# Patient Record
Sex: FEMALE | Race: WHITE | NOT HISPANIC OR LATINO | Employment: FULL TIME | URBAN - METROPOLITAN AREA
[De-identification: names, ages, dates, MRNs, and addresses within clinical notes are randomized per-mention and may not be internally consistent; named-entity substitution may affect disease eponyms.]

---

## 2023-03-13 PROBLEM — L30.1 DYSHIDROTIC ECZEMA: Status: ACTIVE | Noted: 2023-03-13

## 2023-03-13 PROBLEM — L65.9 HAIR LOSS: Status: ACTIVE | Noted: 2023-03-13

## 2023-03-13 PROBLEM — E55.9 HYPOVITAMINOSIS D: Status: ACTIVE | Noted: 2023-03-13

## 2023-03-13 PROBLEM — M67.951 TENDINOPATHY OF RIGHT GLUTEUS MEDIUS: Status: ACTIVE | Noted: 2023-03-13

## 2023-03-13 PROBLEM — M76.899 HAMSTRING TENDINITIS AT ORIGIN: Status: ACTIVE | Noted: 2023-03-13

## 2023-03-13 PROBLEM — L71.9 ROSACEA: Status: ACTIVE | Noted: 2023-03-13

## 2023-03-13 PROBLEM — K62.5 BRBPR (BRIGHT RED BLOOD PER RECTUM): Status: ACTIVE | Noted: 2023-03-13

## 2023-03-13 PROBLEM — M62.838 MUSCLE SPASM: Status: ACTIVE | Noted: 2023-03-13

## 2023-03-13 PROBLEM — F41.1 GENERALIZED ANXIETY DISORDER: Status: ACTIVE | Noted: 2023-03-13

## 2023-03-13 PROBLEM — M79.604 PAIN IN POSTERIOR RIGHT LOWER EXTREMITY: Status: ACTIVE | Noted: 2023-03-13

## 2023-03-13 PROBLEM — R79.89 ABNORMAL CBC: Status: ACTIVE | Noted: 2023-03-13

## 2023-03-13 PROBLEM — R07.89 CHEST TIGHTNESS: Status: ACTIVE | Noted: 2023-03-13

## 2023-03-13 PROBLEM — R09.89 LYMPH NODE SYMPTOM: Status: ACTIVE | Noted: 2023-03-13

## 2023-03-13 PROBLEM — E78.5 HYPERLIPIDEMIA: Status: ACTIVE | Noted: 2023-03-13

## 2023-03-13 PROBLEM — M25.551 RIGHT HIP PAIN: Status: ACTIVE | Noted: 2023-03-13

## 2023-03-13 PROBLEM — M67.952 TENDINOPATHY OF LEFT GLUTEUS MEDIUS: Status: ACTIVE | Noted: 2023-03-13

## 2023-03-13 PROBLEM — J30.9 ALLERGIC RHINITIS: Status: ACTIVE | Noted: 2023-03-13

## 2023-03-13 PROBLEM — H69.92 DYSFUNCTION OF LEFT EUSTACHIAN TUBE: Status: ACTIVE | Noted: 2023-03-13

## 2023-03-13 PROBLEM — R59.0 CERVICAL LYMPHADENOPATHY: Status: ACTIVE | Noted: 2023-03-13

## 2023-03-13 PROBLEM — R92.8 ABNORMAL MAMMOGRAM: Status: ACTIVE | Noted: 2023-03-13

## 2023-03-13 PROBLEM — E04.1 THYROID NODULE: Status: ACTIVE | Noted: 2023-03-13

## 2023-03-13 PROBLEM — A49.02 MRSA (METHICILLIN RESISTANT STAPHYLOCOCCUS AUREUS): Status: ACTIVE | Noted: 2023-03-13

## 2023-03-13 PROBLEM — H81.10 BPPV (BENIGN PAROXYSMAL POSITIONAL VERTIGO): Status: ACTIVE | Noted: 2023-03-13

## 2023-03-13 PROBLEM — R20.2 PARESTHESIAS: Status: ACTIVE | Noted: 2023-03-13

## 2023-03-13 PROBLEM — M62.81 MUSCLE WEAKNESS: Status: ACTIVE | Noted: 2023-03-13

## 2023-03-13 PROBLEM — E06.3 HASHIMOTO'S THYROIDITIS: Status: ACTIVE | Noted: 2023-03-13

## 2023-03-13 PROBLEM — F51.04 PSYCHOPHYSIOLOGICAL INSOMNIA: Status: ACTIVE | Noted: 2023-03-13

## 2023-03-13 PROBLEM — K59.09 CHRONIC CONSTIPATION: Status: ACTIVE | Noted: 2023-03-13

## 2023-03-13 PROBLEM — N39.41 URGE INCONTINENCE OF URINE: Status: ACTIVE | Noted: 2023-03-13

## 2023-03-13 PROBLEM — E03.9 HYPOTHYROIDISM: Status: ACTIVE | Noted: 2023-03-13

## 2023-03-13 PROBLEM — F41.9 ANXIETY: Status: ACTIVE | Noted: 2023-03-13

## 2023-03-13 PROBLEM — N94.10 DYSPAREUNIA, FEMALE: Status: ACTIVE | Noted: 2023-03-13

## 2023-03-13 PROBLEM — N76.0 VAGINITIS: Status: ACTIVE | Noted: 2023-03-13

## 2023-03-13 PROBLEM — M25.552 LEFT HIP PAIN: Status: ACTIVE | Noted: 2023-03-13

## 2023-03-13 PROBLEM — G47.00 INSOMNIA: Status: ACTIVE | Noted: 2023-03-13

## 2023-03-13 PROBLEM — N92.6 IRREGULAR MENSTRUAL CYCLE: Status: ACTIVE | Noted: 2023-03-13

## 2023-03-13 PROBLEM — R79.89 HIGH THYROID STIMULATING HORMONE (TSH) LEVEL: Status: ACTIVE | Noted: 2023-03-13

## 2023-03-13 PROBLEM — J34.2 DEVIATED SEPTUM: Status: ACTIVE | Noted: 2023-03-13

## 2023-03-13 PROBLEM — N89.8 VAGINAL IRRITATION: Status: ACTIVE | Noted: 2023-03-13

## 2023-03-13 RX ORDER — LORATADINE 10 MG/1
1 TABLET ORAL DAILY
COMMUNITY
Start: 2021-07-13

## 2023-03-13 RX ORDER — BISMUTH SUBSALICYLATE 262 MG
1 TABLET,CHEWABLE ORAL DAILY
COMMUNITY

## 2023-03-13 RX ORDER — DROSPIRENONE AND ETHINYL ESTRADIOL 0.02-3(28)
1 KIT ORAL DAILY
COMMUNITY
Start: 2020-09-30 | End: 2024-01-17 | Stop reason: SDUPTHER

## 2023-03-13 RX ORDER — LEVOTHYROXINE SODIUM 75 UG/1
1 TABLET ORAL DAILY
COMMUNITY
Start: 2019-11-02 | End: 2023-05-30 | Stop reason: SDUPTHER

## 2023-03-13 RX ORDER — FLUTICASONE PROPIONATE 50 MCG
1-2 SPRAY, SUSPENSION (ML) NASAL DAILY
COMMUNITY
Start: 2021-07-13

## 2023-03-13 RX ORDER — BUSPIRONE HYDROCHLORIDE 7.5 MG/1
1 TABLET ORAL 2 TIMES DAILY
COMMUNITY
End: 2023-08-14

## 2023-03-13 RX ORDER — TRAZODONE HYDROCHLORIDE 50 MG/1
1 TABLET ORAL NIGHTLY PRN
COMMUNITY
Start: 2022-02-02 | End: 2023-04-14

## 2023-03-14 ENCOUNTER — TELEPHONE (OUTPATIENT)
Dept: PRIMARY CARE | Facility: CLINIC | Age: 43
End: 2023-03-14
Payer: COMMERCIAL

## 2023-03-24 ENCOUNTER — APPOINTMENT (OUTPATIENT)
Dept: PRIMARY CARE | Facility: CLINIC | Age: 43
End: 2023-03-24
Payer: COMMERCIAL

## 2023-03-24 ENCOUNTER — OFFICE VISIT (OUTPATIENT)
Dept: PRIMARY CARE | Facility: CLINIC | Age: 43
End: 2023-03-24
Payer: COMMERCIAL

## 2023-03-24 VITALS
DIASTOLIC BLOOD PRESSURE: 60 MMHG | HEART RATE: 67 BPM | TEMPERATURE: 97.9 F | WEIGHT: 149 LBS | HEIGHT: 69 IN | BODY MASS INDEX: 22.07 KG/M2 | SYSTOLIC BLOOD PRESSURE: 102 MMHG | RESPIRATION RATE: 16 BRPM | OXYGEN SATURATION: 100 %

## 2023-03-24 DIAGNOSIS — F41.1 GENERALIZED ANXIETY DISORDER: Primary | ICD-10-CM

## 2023-03-24 DIAGNOSIS — R07.89 ATYPICAL CHEST PAIN: ICD-10-CM

## 2023-03-24 DIAGNOSIS — G47.00 INSOMNIA, UNSPECIFIED TYPE: ICD-10-CM

## 2023-03-24 PROCEDURE — 99214 OFFICE O/P EST MOD 30 MIN: CPT | Performed by: FAMILY MEDICINE

## 2023-03-24 RX ORDER — VENLAFAXINE HYDROCHLORIDE 37.5 MG/1
37.5 CAPSULE, EXTENDED RELEASE ORAL DAILY
Qty: 14 CAPSULE | Refills: 0 | Status: SHIPPED | OUTPATIENT
Start: 2023-03-24 | End: 2023-08-14

## 2023-03-24 RX ORDER — VENLAFAXINE HYDROCHLORIDE 75 MG/1
75 CAPSULE, EXTENDED RELEASE ORAL DAILY
Qty: 30 CAPSULE | Refills: 1 | Status: SHIPPED | OUTPATIENT
Start: 2023-03-24 | End: 2023-08-14

## 2023-03-24 NOTE — PROGRESS NOTES
"Subjective   Patient ID: Kaylen Dalton is a 42 y.o. female who presents for Anxiety.    Anxiety  Patient is here for evaluation of anxiety.  She has the following anxiety symptoms: insomnia. Onset of symptoms was approximately 10 years ago.  Symptoms have been unchanged since that time. She denies current suicidal and homicidal ideation. Family history significant for no psychiatric illness.Possible organic causes contributing are: none. Risk factors: none Previous treatment includes medication BuSpar.   She complains of the following medication side effects: none.    She tried lexapro and had a side effect she had panic attack symptoms.  She was switched to trazadone and then for her physical in January we switched her to buspirone.  She also failed zoloft.  She hasn't been on an SNRI yet.      Something that bothers her is that she has chest pain when she has panic attacks and now she is having chest pain when she has anxiety attacks. When she has a panic attack it's a rapid beating of the heart and sometimes generalized chest pain.  It doesn't radiate into the jaw or arm.  She denies diaphoresis.  Her dad as high cholesterol but no CAD.  Grandma with CAD.      Anxiety             Review of Systems    Objective   /60   Pulse 67   Temp 36.6 °C (97.9 °F)   Resp 16   Ht 1.753 m (5' 9\")   Wt 67.6 kg (149 lb)   LMP 03/15/2023 (Approximate)   SpO2 100%   BMI 22.00 kg/m²     Physical Exam  Constitutional:       Appearance: Normal appearance.   HENT:      Head: Normocephalic and atraumatic.   Eyes:      Pupils: Pupils are equal, round, and reactive to light.   Cardiovascular:      Rate and Rhythm: Normal rate and regular rhythm.      Pulses: Normal pulses.      Heart sounds: No murmur heard.  Pulmonary:      Effort: Pulmonary effort is normal.      Breath sounds: Normal breath sounds.   Musculoskeletal:         General: No swelling.      Cervical back: Normal range of motion and neck supple. "   Neurological:      Mental Status: She is alert.         Assessment/Plan   Diagnoses and all orders for this visit:  Generalized anxiety disorder  -     venlafaxine XR (Effexor-XR) 37.5 mg 24 hr capsule; Take 1 capsule (37.5 mg) by mouth once daily for 14 days. Do not crush or chew.  -     venlafaxine XR (Effexor-XR) 75 mg 24 hr capsule; Take 1 capsule (75 mg) by mouth once daily. Take with food.  Insomnia, unspecified type  -     Referral to Adult Sleep Medicine; Future  Atypical chest pain  -     CT cardiac scoring wo IV contrast; Future

## 2023-03-24 NOTE — PATIENT INSTRUCTIONS
Today we followed up on anxiety and insomnia.   We will start effexor and have you use buspar prn  We will refer you to sleep specialist for insomnia  We will also have you get a calcium score test done for your cardiac evaluation.  I feel likely your pain is likely in conjunction with panic attacks but we will continue to do the workup to rule out any organic causes.

## 2023-04-13 DIAGNOSIS — G47.00 INSOMNIA, UNSPECIFIED TYPE: Primary | ICD-10-CM

## 2023-04-14 RX ORDER — TRAZODONE HYDROCHLORIDE 50 MG/1
TABLET ORAL
Qty: 90 TABLET | Refills: 1 | Status: SHIPPED | OUTPATIENT
Start: 2023-04-14 | End: 2023-08-14 | Stop reason: SDUPTHER

## 2023-05-17 NOTE — RESULT ENCOUNTER NOTE
Calcium CT Score is 0 which is best possible score - only 0.4% chance of having a cardiac event annually.

## 2023-05-30 ENCOUNTER — PATIENT MESSAGE (OUTPATIENT)
Dept: PRIMARY CARE | Facility: CLINIC | Age: 43
End: 2023-05-30
Payer: COMMERCIAL

## 2023-05-30 DIAGNOSIS — E03.9 HYPOTHYROIDISM, UNSPECIFIED TYPE: ICD-10-CM

## 2023-05-30 RX ORDER — LEVOTHYROXINE SODIUM 75 UG/1
75 TABLET ORAL DAILY
Qty: 90 TABLET | Refills: 0 | Status: SHIPPED | OUTPATIENT
Start: 2023-05-30 | End: 2023-06-02 | Stop reason: SDUPTHER

## 2023-06-02 ENCOUNTER — TELEPHONE (OUTPATIENT)
Dept: PRIMARY CARE | Facility: CLINIC | Age: 43
End: 2023-06-02
Payer: COMMERCIAL

## 2023-06-02 DIAGNOSIS — E03.9 HYPOTHYROIDISM, UNSPECIFIED TYPE: ICD-10-CM

## 2023-06-02 RX ORDER — LEVOTHYROXINE SODIUM 75 UG/1
75 TABLET ORAL DAILY
Qty: 90 TABLET | Refills: 0 | Status: SHIPPED | OUTPATIENT
Start: 2023-06-02 | End: 2023-11-27

## 2023-06-02 NOTE — TELEPHONE ENCOUNTER
Pt called and stated that she has been corresponding with Tess regarding a medication through my chart.  She stated that [facility] said they need the prescription for synthroid called in not sent e-script and that it needs to be marked as urgent Please call it to [phone].

## 2023-08-14 ENCOUNTER — OFFICE VISIT (OUTPATIENT)
Dept: PRIMARY CARE | Facility: CLINIC | Age: 43
End: 2023-08-14
Payer: COMMERCIAL

## 2023-08-14 VITALS
RESPIRATION RATE: 16 BRPM | SYSTOLIC BLOOD PRESSURE: 100 MMHG | TEMPERATURE: 98.5 F | HEART RATE: 61 BPM | WEIGHT: 150 LBS | OXYGEN SATURATION: 99 % | BODY MASS INDEX: 22.81 KG/M2 | DIASTOLIC BLOOD PRESSURE: 66 MMHG

## 2023-08-14 DIAGNOSIS — G47.00 INSOMNIA, UNSPECIFIED TYPE: ICD-10-CM

## 2023-08-14 DIAGNOSIS — E04.1 THYROID NODULE: ICD-10-CM

## 2023-08-14 DIAGNOSIS — F41.9 ANXIETY: Primary | ICD-10-CM

## 2023-08-14 DIAGNOSIS — F41.1 GENERALIZED ANXIETY DISORDER: ICD-10-CM

## 2023-08-14 PROCEDURE — 99214 OFFICE O/P EST MOD 30 MIN: CPT | Performed by: FAMILY MEDICINE

## 2023-08-14 RX ORDER — ESCITALOPRAM OXALATE 5 MG/1
5 TABLET ORAL DAILY
Qty: 30 TABLET | Refills: 1 | Status: SHIPPED | OUTPATIENT
Start: 2023-08-14 | End: 2023-09-06 | Stop reason: ALTCHOICE

## 2023-08-14 RX ORDER — TRAZODONE HYDROCHLORIDE 50 MG/1
50 TABLET ORAL NIGHTLY PRN
Qty: 90 TABLET | Refills: 1 | Status: SHIPPED | OUTPATIENT
Start: 2023-08-14 | End: 2024-01-31 | Stop reason: SDUPTHER

## 2023-08-14 NOTE — PROGRESS NOTES
Subjective   Patient ID: Kaylen Dalton is a 43 y.o. female who presents for Anxiety (Follow up ) and Hypothyroidism (Follow up ).    Hypothyroidism  Kaylen Dalton is a 43 y.o. female who presents for follow up of hypothyroidism. Current symptoms: change in energy level and anxiety. Patient denies diarrhea and heat / cold intolerance. Symptoms have gradually improved.    She has been using trazadone at night. She goes through waves of not feeling very good or feeling more anxious with. She was going to try effexor but she never did.  She thinks it's because she got a panic attack on lexapro.  She has been talking to some family members who are on lexapro though and she knows that her family does well with it.      She had seen a therapist in the past but would like another option.      Anxiety             Review of Systems    Objective   /66   Pulse 61   Temp 36.9 °C (98.5 °F)   Resp 16   Wt 68 kg (150 lb)   LMP 08/07/2023   SpO2 99%   BMI 22.81 kg/m²     Physical Exam  Constitutional:       Appearance: Normal appearance.   HENT:      Head: Normocephalic and atraumatic.      Mouth/Throat:      Mouth: Mucous membranes are moist.   Eyes:      Pupils: Pupils are equal, round, and reactive to light.   Cardiovascular:      Rate and Rhythm: Normal rate.      Pulses: Normal pulses.   Musculoskeletal:      Cervical back: Normal range of motion and neck supple.   Skin:     General: Skin is warm and dry.   Neurological:      General: No focal deficit present.      Mental Status: She is alert and oriented to person, place, and time. Mental status is at baseline.   Psychiatric:         Mood and Affect: Mood normal.         Behavior: Behavior normal.         Thought Content: Thought content normal.         Judgment: Judgment normal.         Assessment/Plan   Diagnoses and all orders for this visit:  Anxiety  -     escitalopram (Lexapro) 5 mg tablet; Take 1 tablet (5 mg) by mouth once daily.  Generalized  anxiety disorder  Thyroid nodule  -     Thyroxine, Free; Future  -     Thyroid Stimulating Hormone; Future  Insomnia, unspecified type  -     traZODone (Desyrel) 50 mg tablet; Take 1 tablet (50 mg) by mouth as needed at bedtime for sleep.

## 2023-08-14 NOTE — PATIENT INSTRUCTIONS
Today we followed up on your Thyroid medication - we will check your labs and adjust the medication accordingly.  Our goal will be to have a TSH between 2 and 3.  We will closely monitor your symptoms to determine the optimal dosing.     [facility] - [phone]  [facility]) -  [phone]  [facility] - [phone]   [facility]) - [phone]  [facility]) - [phone]  [facility] -  [phone]  ONLINE: [place], [phone]                 [email or web address] ([facility])    TEXT 980 Suicide and Crisis Lifeline  Hours: Available 24 hours. Languages: English, Uzbek     Today we have addressed your anxiety.  We have discussed starting an SSRI in addition to counseling/CBT.  I have ordered labs to rule out any organic causes.  I have advised that you RTC in 6 weeks for a recheck.

## 2023-08-18 ENCOUNTER — LAB (OUTPATIENT)
Dept: LAB | Facility: LAB | Age: 43
End: 2023-08-18
Payer: COMMERCIAL

## 2023-08-18 DIAGNOSIS — E04.1 THYROID NODULE: ICD-10-CM

## 2023-08-18 LAB
THYROTROPIN (MIU/L) IN SER/PLAS BY DETECTION LIMIT <= 0.05 MIU/L: 3.15 MIU/L (ref 0.44–3.98)
THYROXINE (T4) FREE (NG/DL) IN SER/PLAS: 0.73 NG/DL (ref 0.61–1.12)

## 2023-08-18 PROCEDURE — 84439 ASSAY OF FREE THYROXINE: CPT

## 2023-08-18 PROCEDURE — 84443 ASSAY THYROID STIM HORMONE: CPT

## 2023-08-18 PROCEDURE — 36415 COLL VENOUS BLD VENIPUNCTURE: CPT

## 2023-09-06 ENCOUNTER — TELEMEDICINE (OUTPATIENT)
Dept: PRIMARY CARE | Facility: CLINIC | Age: 43
End: 2023-09-06
Payer: COMMERCIAL

## 2023-09-06 DIAGNOSIS — L23.7 POISON IVY DERMATITIS: Primary | ICD-10-CM

## 2023-09-06 PROCEDURE — 99422 OL DIG E/M SVC 11-20 MIN: CPT | Performed by: FAMILY MEDICINE

## 2023-09-06 NOTE — PROGRESS NOTES
Rash--  Poison ivy--  got rash 5 days prior to her--her rash started 5 days ago  Sxs similar- his sxs were way worse  No pets  May have been in contact with plant  New random spots popping up  Some are seeping--  Pretty wide spread on underside of arms and lower legs and slight on abdomen  Waking up at night with itching--   Past 3 nights with intense itching  Has been trying hard not to touch it  Calamine and cortisone cream-- without much relief  Has sensitive skin  Has mupirocin cream --has not tried it for this--   Has h/o eczema and has steroid cream--fluocinonide cream 0.05%      ALL OTHER ROS (-)    General appearance:  Vitals available from patient?No  Alert, oriented, pleasant, in no apparent distress Yes  Answers questions appropriatelyYes  Eyes clear?Yes  Throat exam Not Available  Is patient in respiratory distressNo  Is patient coughing during consult:No  Audible wheezing noted? :No  Pt sounds congested?: No  Sniffing or rhinorrhea?: No  Frontal sinus TTP by palpation? N/A  Maxillary sinus TTP by palpation?N/A  Tender neck LAD by pt exam?:N/A  Preauricular LAD by pt exam?:N/A  Abdominal TTP by pt exam?:N/A  [facility] tenderness by pt exam?N/A  Psychiatric: Affect normalYes  Other relevant physical exam:  Vesicles on arms without red streaks or excoriations  Red  patches on legs --dry appearing and some seeping noted    Pt location in [place] and consent obtained. Telemedicine appropriate evaluation completed. Appropriate physical exam done.     Poison Ivy  Discussed oral steroids and side effects-- pt prefers to try topical first--has high potency steroid cream at home she will try--discussed AE of topical as well as oral steroids  Antihistamines  Mupirocin for lesions that may get infected from scratching

## 2023-09-07 NOTE — PATIENT INSTRUCTIONS
Please send me a [employer] message if you have any questions or concerns.  FOR NON URGENT questions only.  Allow up to 72 hours for response.    If you have prescription issues or other questions you can email   Nikia Blanca  Digital Health Coordinator, at   [email or web address]    Poison Ivy  Discussed oral steroids and side effects-- pt prefers to try topical first--has high potency steroid cream at home she will try--discussed AE of topical as well as oral steroids  Antihistamines  Mupirocin for lesions that may get infected from scratching    Rest and drink plenty of fluids    Tylenol and or motrin as needed for pain and fever (unless you have been told not to take these because of your personal medical history)    Discussed options and precautions:   Viral versus bacterial infection; use of medications; possible side effects; appropriate over-the-counter medications; possible complications and /or when to follow-up.    Follow-up in 1 to 2 days if not improving.  Follow-up immediately if symptoms worsen.    All red flags requiring in person care were discussed.  All patient's questions were answered.    Limitations to telemedicine include inability to do a complete and accurate physical exam.  Any concerns regarding this were conveyed with the patient and in person follow-up recommended if patient nature of illness does not progress as anticipated during this visit.     A few things about steroids:  they can increase your heart rate and blood pressure, they can cause insomnia, they can make you vance/grumpy, they can increase your appetite, they can cause your lower legs to swell, they can irritate the lining of your stomach so never take on an empty stomach, do not take any OTC anti-inflammatories like motrin, aleve, ibuprofen while taking prednisone, use tylenol if you need something for pain.  They can also cause adrenal suppression (most concerning in people who take steroids daily or  frequently) and they can cause immunosuppression so you are more likely to get sick while on them.  When we decide to use steroids we weight the risks and the benefits.  When breathing is affected, the benefits of steroids generally outweigh the risks.    TOPICAL STEROIDS:  Use sparingly ONLY on affected skin in a THIN LAYER for the shortest duration of time needed.  Can cause lightening of the skin, spider veins, thinning of the skin, and/or stretch marks.

## 2023-11-27 DIAGNOSIS — E03.9 HYPOTHYROIDISM, UNSPECIFIED TYPE: ICD-10-CM

## 2023-11-27 RX ORDER — LEVOTHYROXINE SODIUM 75 UG/1
75 TABLET ORAL DAILY
Qty: 90 TABLET | Refills: 0 | Status: SHIPPED | OUTPATIENT
Start: 2023-11-27 | End: 2024-01-17 | Stop reason: SDUPTHER

## 2023-11-27 RX ORDER — LEVOTHYROXINE SODIUM 75 UG/1
75 TABLET ORAL DAILY
Qty: 90 TABLET | Refills: 3 | OUTPATIENT
Start: 2023-11-27

## 2024-01-17 ENCOUNTER — TELEPHONE (OUTPATIENT)
Dept: PRIMARY CARE | Facility: CLINIC | Age: 44
End: 2024-01-17
Payer: COMMERCIAL

## 2024-01-17 DIAGNOSIS — Z00.00 HEALTHCARE MAINTENANCE: ICD-10-CM

## 2024-01-17 DIAGNOSIS — Z00.00 HEALTHCARE MAINTENANCE: Primary | ICD-10-CM

## 2024-01-17 DIAGNOSIS — E03.9 HYPOTHYROIDISM, UNSPECIFIED TYPE: ICD-10-CM

## 2024-01-17 RX ORDER — DROSPIRENONE AND ETHINYL ESTRADIOL 0.02-3(28)
1 KIT ORAL DAILY
Qty: 28 TABLET | Refills: 0 | Status: SHIPPED | OUTPATIENT
Start: 2024-01-17 | End: 2024-01-17 | Stop reason: SDUPTHER

## 2024-01-17 RX ORDER — DROSPIRENONE AND ETHINYL ESTRADIOL 0.02-3(28)
1 KIT ORAL DAILY
Qty: 84 TABLET | Refills: 3 | Status: SHIPPED | OUTPATIENT
Start: 2024-01-17 | End: 2024-01-17 | Stop reason: SDUPTHER

## 2024-01-17 RX ORDER — LEVOTHYROXINE SODIUM 75 UG/1
75 TABLET ORAL DAILY
Qty: 30 TABLET | Refills: 0 | Status: SHIPPED | OUTPATIENT
Start: 2024-01-17 | End: 2024-01-17 | Stop reason: SDUPTHER

## 2024-01-17 NOTE — TELEPHONE ENCOUNTER
Pt called and stated that she needs a refill on lo-zumandimine.(Birth control)  She is completely out and is asking that a 30 day supply be sent to [facility] in [place] and then a 90 day supply sent to [facility].  She has a physical scheduled for 1/31/24

## 2024-01-17 NOTE — TELEPHONE ENCOUNTER
Pt needed a [facility] day supply [facility] in [place] she is bryanley out of the medication is this possible

## 2024-01-17 NOTE — TELEPHONE ENCOUNTER
"So sorry, I misread the task! Patient needs birth control sent, not her thyroid med. It was sent to the pharm earlier but it says \"transmission failed\".     Birth control formatted for 30 days.   "

## 2024-01-17 NOTE — TELEPHONE ENCOUNTER
It does not show in her chart that it was sent to local pharm, not even in med history and the mail order Rx says it was last sent in 11/2023.     Formatted 30 day supply for local.

## 2024-01-19 RX ORDER — DROSPIRENONE AND ETHINYL ESTRADIOL 0.02-3(28)
1 KIT ORAL DAILY
Qty: 28 TABLET | Refills: 0 | Status: SHIPPED | OUTPATIENT
Start: 2024-01-19 | End: 2024-01-31 | Stop reason: WASHOUT

## 2024-01-31 ENCOUNTER — HOSPITAL ENCOUNTER (OUTPATIENT)
Dept: RADIOLOGY | Facility: CLINIC | Age: 44
Discharge: HOME | End: 2024-01-31
Payer: COMMERCIAL

## 2024-01-31 ENCOUNTER — OFFICE VISIT (OUTPATIENT)
Dept: PRIMARY CARE | Facility: CLINIC | Age: 44
End: 2024-01-31
Payer: COMMERCIAL

## 2024-01-31 VITALS
WEIGHT: 149 LBS | DIASTOLIC BLOOD PRESSURE: 62 MMHG | OXYGEN SATURATION: 99 % | RESPIRATION RATE: 16 BRPM | SYSTOLIC BLOOD PRESSURE: 104 MMHG | BODY MASS INDEX: 22.58 KG/M2 | HEIGHT: 68 IN | TEMPERATURE: 97.8 F | HEART RATE: 66 BPM

## 2024-01-31 DIAGNOSIS — G47.00 INSOMNIA, UNSPECIFIED TYPE: ICD-10-CM

## 2024-01-31 DIAGNOSIS — Z12.31 BREAST CANCER SCREENING BY MAMMOGRAM: ICD-10-CM

## 2024-01-31 DIAGNOSIS — M25.561 ACUTE PAIN OF RIGHT KNEE: ICD-10-CM

## 2024-01-31 DIAGNOSIS — E03.9 HYPOTHYROIDISM, UNSPECIFIED TYPE: ICD-10-CM

## 2024-01-31 DIAGNOSIS — Z12.11 SCREENING FOR MALIGNANT NEOPLASM OF COLON: ICD-10-CM

## 2024-01-31 DIAGNOSIS — E55.9 HYPOVITAMINOSIS D: ICD-10-CM

## 2024-01-31 DIAGNOSIS — Z00.00 HEALTHCARE MAINTENANCE: Primary | ICD-10-CM

## 2024-01-31 DIAGNOSIS — Z12.4 CERVICAL CANCER SCREENING: ICD-10-CM

## 2024-01-31 LAB
POC APPEARANCE, URINE: CLEAR
POC BILIRUBIN, URINE: NEGATIVE
POC BLOOD, URINE: NEGATIVE
POC COLOR, URINE: YELLOW
POC GLUCOSE, URINE: NEGATIVE MG/DL
POC KETONES, URINE: NEGATIVE MG/DL
POC LEUKOCYTES, URINE: NEGATIVE
POC NITRITE,URINE: NEGATIVE
POC PH, URINE: 7.5 PH
POC PROTEIN, URINE: NEGATIVE MG/DL
POC SPECIFIC GRAVITY, URINE: 1.01
POC UROBILINOGEN, URINE: 0.2 EU/DL

## 2024-01-31 PROCEDURE — 88175 CYTOPATH C/V AUTO FLUID REDO: CPT

## 2024-01-31 PROCEDURE — 99214 OFFICE O/P EST MOD 30 MIN: CPT | Performed by: FAMILY MEDICINE

## 2024-01-31 PROCEDURE — 1036F TOBACCO NON-USER: CPT | Performed by: FAMILY MEDICINE

## 2024-01-31 PROCEDURE — 81002 URINALYSIS NONAUTO W/O SCOPE: CPT | Performed by: FAMILY MEDICINE

## 2024-01-31 PROCEDURE — 73564 X-RAY EXAM KNEE 4 OR MORE: CPT | Mod: RIGHT SIDE | Performed by: RADIOLOGY

## 2024-01-31 PROCEDURE — 87624 HPV HI-RISK TYP POOLED RSLT: CPT

## 2024-01-31 PROCEDURE — 88141 CYTOPATH C/V INTERPRET: CPT | Performed by: PATHOLOGY

## 2024-01-31 PROCEDURE — 73564 X-RAY EXAM KNEE 4 OR MORE: CPT | Mod: RT

## 2024-01-31 PROCEDURE — 99396 PREV VISIT EST AGE 40-64: CPT | Performed by: FAMILY MEDICINE

## 2024-01-31 RX ORDER — NORETHINDRONE ACETATE AND ETHINYL ESTRADIOL 1.5-30(21)
1 KIT ORAL DAILY
Qty: 84 TABLET | Refills: 3 | Status: SHIPPED | OUTPATIENT
Start: 2024-01-31 | End: 2025-01-30

## 2024-01-31 RX ORDER — TRAZODONE HYDROCHLORIDE 50 MG/1
75 TABLET ORAL NIGHTLY
Qty: 135 TABLET | Refills: 1 | Status: SHIPPED | OUTPATIENT
Start: 2024-01-31 | End: 2024-04-09 | Stop reason: ALTCHOICE

## 2024-01-31 ASSESSMENT — PATIENT HEALTH QUESTIONNAIRE - PHQ9
2. FEELING DOWN, DEPRESSED OR HOPELESS: NOT AT ALL
1. LITTLE INTEREST OR PLEASURE IN DOING THINGS: NOT AT ALL
SUM OF ALL RESPONSES TO PHQ9 QUESTIONS 1 AND 2: 0

## 2024-01-31 NOTE — PATIENT INSTRUCTIONS
Today we performed your Annual Physical Exam.  Your preventative health care, labs and vaccinations have been reviewed and are up to date.      Your vaccines are up to date.     We have addressed your knee pain and I have recommended an Xray to rule out any andria abnormalities since you injured it over a month ago and have still been experiencing pain.  I also recommend PT to strengthen the muscles around the joint.      For your sleep we have addressed the insomnia and we will increase trazadone slightly since you are sleeping well but waking up around 3am.  Hopefully with a slight increase you will sleep better through the night.  I recommended weighted blanket as well.     For your ocp's we will go to a low dose ocp as you approach your late 40's and goal to stop around 48.      Today we followed up on your Thyroid medication - we will check your labs and adjust the medication accordingly.  Our goal will be to have a TSH between 2 and 3.  We will closely monitor your symptoms to determine the optimal dosing.     Follow up in 1 year for your Complete Physical Exam    
Self

## 2024-01-31 NOTE — PROGRESS NOTES
"Subjective   Patient ID: Arpita Dalton is a 43 y.o. female who presents for Annual Exam.    Dentist and Eye Doctor appointments: UTD  Immunizations: UTD  Diet: Overall healthy and always trying to get more veggies  Exercise: blend of walking, swimming, yoga, jogging occasionally,   Supplements: mvi  Menstrual Cycles: Regular   Sexually Active: yes, male, no std concern  Pregnancy History: G0  Cancer Screening:    Cervical: due   Breast:due   Colon: 2021   Skin:UTD   DEXA:N/A       Hurt her knee in [place] when she was hiking.  Right knee hurts medially.  It's just achey.  She took some motrin initially.           Review of Systems    Objective   /62   Pulse 66   Temp 36.6 °C (97.8 °F)   Resp 16   Ht 1.727 m (5' 8\")   Wt 67.6 kg (149 lb)   LMP 01/15/2024 Comment: last pap 9/2019 PCP  SpO2 99%   BMI 22.66 kg/m²     Physical Exam  Constitutional:       General: She is not in acute distress.     Appearance: She is well-developed. She is not diaphoretic.   HENT:      Head: Normocephalic and atraumatic.      Right Ear: Tympanic membrane normal.      Left Ear: Tympanic membrane normal.      Nose: Nose normal.      Mouth/Throat:      Mouth: Mucous membranes are moist.   Eyes:      General: No scleral icterus.     Pupils: Pupils are equal, round, and reactive to light.   Neck:      Thyroid: No thyromegaly.      Vascular: No JVD.   Cardiovascular:      Rate and Rhythm: Normal rate and regular rhythm.      Heart sounds: Normal heart sounds. No murmur heard.     No friction rub. No gallop.   Pulmonary:      Effort: Pulmonary effort is normal. No respiratory distress.      Breath sounds: Normal breath sounds. No wheezing or rales.   Chest:      Chest wall: No tenderness.   Abdominal:      General: Bowel sounds are normal. There is no distension.      Palpations: Abdomen is soft. There is no mass.      Tenderness: There is no abdominal tenderness. There is no rebound.   Musculoskeletal:         General: " Tenderness (right knee medial joint line) present. No swelling or deformity. Normal range of motion.      Cervical back: Normal range of motion and neck supple.      Comments: Negative patellar motion test   Lymphadenopathy:      Cervical: No cervical adenopathy.   Skin:     General: Skin is warm and dry.   Neurological:      General: No focal deficit present.      Mental Status: She is alert and oriented to person, place, and time.      Deep Tendon Reflexes: Reflexes normal.   Psychiatric:         Mood and Affect: Mood normal.         Behavior: Behavior normal.         Assessment/Plan   Diagnoses and all orders for this visit:  Healthcare maintenance  -     POCT UA (nonautomated) manually resulted  -     CBC; Future  -     Comprehensive Metabolic Panel; Future  -     Lipid Panel; Future  -     norethindrone-e.estradioL-iron (Microgestin FE 1.5/30) 1.5 mg-30 mcg (21)/75 mg (7) tablet; Take 1 tablet by mouth once daily.  Cervical cancer screening  -     THINPREP PAP TEST  Hypovitaminosis D  -     Vitamin D 25 hydroxy; Future  Hypothyroidism, unspecified type  -     Thyroid Stimulating Hormone; Future  -     Thyroxine, Free; Future  Acute pain of right knee  -     XR knee right 3 views; Future  -     Referral to Physical Therapy; Future  Breast cancer screening by mammogram  -     BI mammo bilateral screening tomosynthesis; Future  Screening for malignant neoplasm of colon  Comments:  2021 - due in 2031  Insomnia, unspecified type  -     traZODone (Desyrel) 50 mg tablet; Take 1.5 tablets (75 mg) by mouth once daily at bedtime.

## 2024-02-07 ENCOUNTER — PATIENT MESSAGE (OUTPATIENT)
Dept: PRIMARY CARE | Facility: CLINIC | Age: 44
End: 2024-02-07
Payer: COMMERCIAL

## 2024-02-07 DIAGNOSIS — M25.561 ACUTE PAIN OF RIGHT KNEE: Primary | ICD-10-CM

## 2024-02-08 ENCOUNTER — LAB (OUTPATIENT)
Dept: LAB | Facility: LAB | Age: 44
End: 2024-02-08
Payer: COMMERCIAL

## 2024-02-08 DIAGNOSIS — E03.9 HYPOTHYROIDISM, UNSPECIFIED TYPE: ICD-10-CM

## 2024-02-08 DIAGNOSIS — E55.9 HYPOVITAMINOSIS D: ICD-10-CM

## 2024-02-08 DIAGNOSIS — Z00.00 HEALTHCARE MAINTENANCE: ICD-10-CM

## 2024-02-08 LAB
25(OH)D3 SERPL-MCNC: 39 NG/ML (ref 30–100)
ALBUMIN SERPL BCP-MCNC: 4.2 G/DL (ref 3.4–5)
ALP SERPL-CCNC: 32 U/L (ref 33–110)
ALT SERPL W P-5'-P-CCNC: 16 U/L (ref 7–45)
ANION GAP SERPL CALC-SCNC: 11 MMOL/L (ref 10–20)
AST SERPL W P-5'-P-CCNC: 21 U/L (ref 9–39)
BILIRUB SERPL-MCNC: 0.4 MG/DL (ref 0–1.2)
BUN SERPL-MCNC: 9 MG/DL (ref 6–23)
CALCIUM SERPL-MCNC: 9.3 MG/DL (ref 8.6–10.3)
CHLORIDE SERPL-SCNC: 103 MMOL/L (ref 98–107)
CHOLEST SERPL-MCNC: 235 MG/DL (ref 0–199)
CHOLESTEROL/HDL RATIO: 3
CO2 SERPL-SCNC: 26 MMOL/L (ref 21–32)
CREAT SERPL-MCNC: 1.06 MG/DL (ref 0.5–1.05)
EGFRCR SERPLBLD CKD-EPI 2021: 66 ML/MIN/1.73M*2
ERYTHROCYTE [DISTWIDTH] IN BLOOD BY AUTOMATED COUNT: 13.1 % (ref 11.5–14.5)
GLUCOSE SERPL-MCNC: 83 MG/DL (ref 74–99)
HCT VFR BLD AUTO: 41.7 % (ref 36–46)
HDLC SERPL-MCNC: 78.3 MG/DL
HGB BLD-MCNC: 13.7 G/DL (ref 12–16)
LDLC SERPL CALC-MCNC: 136 MG/DL
MCH RBC QN AUTO: 30.1 PG (ref 26–34)
MCHC RBC AUTO-ENTMCNC: 32.9 G/DL (ref 32–36)
MCV RBC AUTO: 92 FL (ref 80–100)
NON HDL CHOLESTEROL: 157 MG/DL (ref 0–149)
NRBC BLD-RTO: 0 /100 WBCS (ref 0–0)
PLATELET # BLD AUTO: 231 X10*3/UL (ref 150–450)
POTASSIUM SERPL-SCNC: 4.2 MMOL/L (ref 3.5–5.3)
PROT SERPL-MCNC: 7.4 G/DL (ref 6.4–8.2)
RBC # BLD AUTO: 4.55 X10*6/UL (ref 4–5.2)
SODIUM SERPL-SCNC: 136 MMOL/L (ref 136–145)
T4 FREE SERPL-MCNC: 0.71 NG/DL (ref 0.61–1.12)
TRIGL SERPL-MCNC: 102 MG/DL (ref 0–149)
TSH SERPL-ACNC: 4.82 MIU/L (ref 0.44–3.98)
VLDL: 20 MG/DL (ref 0–40)
WBC # BLD AUTO: 7.8 X10*3/UL (ref 4.4–11.3)

## 2024-02-08 PROCEDURE — 85027 COMPLETE CBC AUTOMATED: CPT

## 2024-02-08 PROCEDURE — 84439 ASSAY OF FREE THYROXINE: CPT

## 2024-02-08 PROCEDURE — 82306 VITAMIN D 25 HYDROXY: CPT

## 2024-02-08 PROCEDURE — 36415 COLL VENOUS BLD VENIPUNCTURE: CPT

## 2024-02-08 PROCEDURE — 84443 ASSAY THYROID STIM HORMONE: CPT

## 2024-02-08 PROCEDURE — 80053 COMPREHEN METABOLIC PANEL: CPT

## 2024-02-08 PROCEDURE — 80061 LIPID PANEL: CPT

## 2024-02-09 DIAGNOSIS — E03.9 HYPOTHYROIDISM, UNSPECIFIED TYPE: Primary | ICD-10-CM

## 2024-02-09 RX ORDER — LEVOTHYROXINE SODIUM 100 UG/1
100 TABLET ORAL DAILY
Qty: 30 TABLET | Refills: 3 | Status: SHIPPED | OUTPATIENT
Start: 2024-02-09 | End: 2024-05-24 | Stop reason: SDUPTHER

## 2024-02-14 ENCOUNTER — OFFICE VISIT (OUTPATIENT)
Dept: ORTHOPEDIC SURGERY | Facility: CLINIC | Age: 44
End: 2024-02-14
Payer: COMMERCIAL

## 2024-02-14 DIAGNOSIS — S83.419A SPRAIN OF MEDIAL COLLATERAL LIGAMENT OF KNEE, INITIAL ENCOUNTER: Primary | ICD-10-CM

## 2024-02-14 PROCEDURE — 99203 OFFICE O/P NEW LOW 30 MIN: CPT | Performed by: PEDIATRICS

## 2024-02-14 PROCEDURE — 1036F TOBACCO NON-USER: CPT | Performed by: PEDIATRICS

## 2024-02-14 PROCEDURE — 99213 OFFICE O/P EST LOW 20 MIN: CPT | Performed by: PEDIATRICS

## 2024-02-14 NOTE — LETTER
February 14, 2024     Kayy Self DO  [address]  [address]  [facility] [address]    Patient: Arpita Dalton   YOB: 1980   Date of Visit: 2/14/2024       Dear Dr. Kayy Self DO:    Thank you for referring Arpita Dalton to me for evaluation. Below are my notes for this consultation.  If you have questions, please do not hesitate to call me. I look forward to following your patient along with you.       Sincerely,     Laura D Goldberg, MD      CC: No Recipients  ______________________________________________________________________________________    Consulting physician: Kayy Self DO  A report with my findings and recommendations will be sent to the primary and referring physician via written or electronic means when information is available    History of Present Illness:  Kaylen Dalton is a 43 y.o. female here with right medial knee pain x 6 weeks. She was hiking and felt her knee give out but no direct trauma. She was sore and achy but able to walk.   She has rested from activity and almost cancelled due to feeling better. However, she tried to run today and had recurrence of pain.    Worse with: activty  Better with: rest    Prior Treatment:   Prior Evaluation by Physician: No  Physical Therapy: No  Medications: No  Modified activities/sports  Yes - rested x 5 weeks then tried to run today and felt sore during and more after    Social Hx:  active    Past [facility] HX:  Specialty Problems    None    Medications:   Current Outpatient Medications on File Prior to Visit   Medication Sig Dispense Refill   • fluticasone (Flonase) 50 mcg/actuation nasal spray Administer 1-2 sprays into each nostril once daily.     • levothyroxine (Synthroid, Levoxyl) 100 mcg tablet Take 1 tablet (100 mcg) by mouth once daily. 30 tablet 3   • loratadine (Claritin) 10 mg tablet Take 1 tablet (10 mg) by mouth once daily.     • multivitamin tablet Take 1 tablet by mouth once daily.     •  norethindrone-e.estradioL-iron (Microgestin FE 1.5/30) 1.5 mg-30 mcg (21)/75 mg (7) tablet Take 1 tablet by mouth once daily. 84 tablet 3   • traZODone (Desyrel) 50 mg tablet Take 1.5 tablets (75 mg) by mouth once daily at bedtime. 135 tablet 1   • [DISCONTINUED] levothyroxine (Synthroid, Levoxyl) 75 mcg tablet TAKE 1 TABLET BY MOUTH ONCE DAILY. 30 tablet 0     No current facility-administered medications on file prior to visit.         Allergies:    Allergies   Allergen Reactions   • Erythromycin Nausea Only        Physical Exam:  General appearance: Well-appearing well-nourished  Psych: Normal mood and affect    INSPECTION:  no soft tissue swelling, redness, or warmth  no skin changes  no deformities    FUNCTIONAL:  Gait normal without limp  Single leg standing balance--> good  Single leg heel raise --> pronation?? NO  Single leg semi-squat--> contralateral hip drop with valgus knee     MOTION:  log roll: no hip pain with Full PROM KASHIF  HIP Flex to 90/knee to 90: no hip pain with Full PROM KASHIF  SLR: no low back pain or radicular pain KASHIF  Hip flexion: 5/5 strength KASHIF without pain    Knee: Full PROM without PAIN KASHIF  Knee ext: 5/5 KASHIF  Knee Flexion: 5/5 KASHIF    PALPATION:  Effusion: trace  Peripatellar: no TTP, Neg Grind, normal glide, neg tilt, neg apprehension  Joint line: medial TTP  Quad tendon: WNL, NTTP  Patella tendon: WNL, NTTP  MCL: +TTP distal end, NO opening at 0, 30d  LCL: No Pain, No opening at 0, 30d  Anterior Drawer: equal excursion kashif with endpoint --> NEG  Posterior Drawer: no sag and good end point--> NEG  Lachmans: equal excursion kasihf with endpoint --> NEG  McMurrays: + joint line pain, no catch--> medial joint line pain with load of lateral  Bounce: NEG        Imaging: Recent radiology images previously obtained within our facility were independently reviewed in the presence of the patient's family.      Impression and Plan:  Kaylen Dalton is a 43 y.o. female with   1. Sprain of medial  collateral ligament of knee, initial encounter        PLAN/FOLLOW UP:    Rest, ice, nsaids  Increase activity to tolerance  Formal pt scheduled for 2 weeks. If not improving, call for MRI given prolonged pain, swelling, pain with mcmurrays     DIagnosis, evaluation, and treatment options were explained to patient in detail and questions answered.   See Patient Instructions for more details of what was provided to patient with further information on diagnosis, evaluation, and treatment.   Home exercises were explained and included if appropriate.  Further treatment as discussed.    Call Pediatric Sports Medicine Office [phone] if not improving as expected or any further concern.      ** Please excuse any errors in grammar or translation related to this dictation. Voice recognition software was utilized to prepare this document. **

## 2024-02-14 NOTE — PROGRESS NOTES
Consulting physician: Kayy Self,   A report with my findings and recommendations will be sent to the primary and referring physician via written or electronic means when information is available    History of Present Illness:  Kaylen Dalton is a 43 y.o. female here with right medial knee pain x 6 weeks. She was hiking and felt her knee give out but no direct trauma. She was sore and achy but able to walk.   She has rested from activity and almost cancelled due to feeling better. However, she tried to run today and had recurrence of pain.    Worse with: activty  Better with: rest    Prior Treatment:   Prior Evaluation by Physician: No  Physical Therapy: No  Medications: No  Modified activities/sports  Yes - rested x 5 weeks then tried to run today and felt sore during and more after    Social Hx:  active    Past [facility] HX:  Specialty Problems    None    Medications:   Current Outpatient Medications on File Prior to Visit   Medication Sig Dispense Refill    fluticasone (Flonase) 50 mcg/actuation nasal spray Administer 1-2 sprays into each nostril once daily.      levothyroxine (Synthroid, Levoxyl) 100 mcg tablet Take 1 tablet (100 mcg) by mouth once daily. 30 tablet 3    loratadine (Claritin) 10 mg tablet Take 1 tablet (10 mg) by mouth once daily.      multivitamin tablet Take 1 tablet by mouth once daily.      norethindrone-e.estradioL-iron (Microgestin FE 1.5/30) 1.5 mg-30 mcg (21)/75 mg (7) tablet Take 1 tablet by mouth once daily. 84 tablet 3    traZODone (Desyrel) 50 mg tablet Take 1.5 tablets (75 mg) by mouth once daily at bedtime. 135 tablet 1    [DISCONTINUED] levothyroxine (Synthroid, Levoxyl) 75 mcg tablet TAKE 1 TABLET BY MOUTH ONCE DAILY. 30 tablet 0     No current facility-administered medications on file prior to visit.         Allergies:    Allergies   Allergen Reactions    Erythromycin Nausea Only        Physical Exam:  General appearance: Well-appearing well-nourished  Psych: Normal mood  and affect    INSPECTION:  no soft tissue swelling, redness, or warmth  no skin changes  no deformities    FUNCTIONAL:  Gait normal without limp  Single leg standing balance--> good  Single leg heel raise --> pronation?? NO  Single leg semi-squat--> contralateral hip drop with valgus knee     MOTION:  log roll: no hip pain with Full PROM KASHIF  HIP Flex to 90/knee to 90: no hip pain with Full PROM KASHIF  SLR: no low back pain or radicular pain KASHIF  Hip flexion: 5/5 strength KASHIF without pain    Knee: Full PROM without PAIN KASHIF  Knee ext: 5/5 KASHIF  Knee Flexion: 5/5 KASHIF    PALPATION:  Effusion: trace  Peripatellar: no TTP, Neg Grind, normal glide, neg tilt, neg apprehension  Joint line: medial TTP  Quad tendon: WNL, NTTP  Patella tendon: WNL, NTTP  MCL: +TTP distal end, NO opening at 0, 30d  LCL: No Pain, No opening at 0, 30d  Anterior Drawer: equal excursion kashif with endpoint --> NEG  Posterior Drawer: no sag and good end point--> NEG  Lachmans: equal excursion kashif with endpoint --> NEG  McMurrays: + joint line pain, no catch--> medial joint line pain with load of lateral  Bounce: NEG        Imaging: Recent radiology images previously obtained within our facility were independently reviewed in the presence of the patient's family.      Impression and Plan:  Kaylen Dalton is a 43 y.o. female with   1. Sprain of medial collateral ligament of knee, initial encounter        PLAN/FOLLOW UP:    Rest, ice, nsaids  Increase activity to tolerance  Formal pt scheduled for 2 weeks. If not improving, call for MRI given prolonged pain, swelling, pain with mcmurrays     DIagnosis, evaluation, and treatment options were explained to patient in detail and questions answered.   See Patient Instructions for more details of what was provided to patient with further information on diagnosis, evaluation, and treatment.   Home exercises were explained and included if appropriate.  Further treatment as discussed.    Call Pediatric Sports  Medicine Office [phone] if not improving as expected or any further concern.      ** Please excuse any errors in grammar or translation related to this dictation. Voice recognition software was utilized to prepare this document. **

## 2024-02-15 ENCOUNTER — TELEPHONE (OUTPATIENT)
Dept: PRIMARY CARE | Facility: CLINIC | Age: 44
End: 2024-02-15
Payer: COMMERCIAL

## 2024-02-15 DIAGNOSIS — J40 BRONCHITIS: Primary | ICD-10-CM

## 2024-02-15 RX ORDER — AZITHROMYCIN 250 MG/1
250 TABLET, FILM COATED ORAL DAILY
Qty: 6 TABLET | Refills: 0 | Status: SHIPPED | OUTPATIENT
Start: 2024-02-15 | End: 2024-02-20

## 2024-02-23 ENCOUNTER — EVALUATION (OUTPATIENT)
Dept: PHYSICAL THERAPY | Facility: CLINIC | Age: 44
End: 2024-02-23
Payer: COMMERCIAL

## 2024-02-23 DIAGNOSIS — M25.561 ACUTE PAIN OF RIGHT KNEE: ICD-10-CM

## 2024-02-23 PROCEDURE — 97161 PT EVAL LOW COMPLEX 20 MIN: CPT | Mod: GP | Performed by: PHYSICAL THERAPIST

## 2024-02-23 PROCEDURE — 97110 THERAPEUTIC EXERCISES: CPT | Mod: GP | Performed by: PHYSICAL THERAPIST

## 2024-02-23 ASSESSMENT — PAIN SCALES - GENERAL: PAINLEVEL_OUTOF10: 2

## 2024-02-23 ASSESSMENT — PAIN DESCRIPTION - DESCRIPTORS: DESCRIPTORS: ACHING;DULL

## 2024-02-23 ASSESSMENT — PAIN - FUNCTIONAL ASSESSMENT: PAIN_FUNCTIONAL_ASSESSMENT: 0-10

## 2024-02-23 NOTE — PROGRESS NOTES
"Physical Therapy    Physical Therapy Evaluation and Treatment      Patient Name: Kaylen Dalton \"Arpita\"  MRN: [id number]  Today's Date: 2/23/2024  Time Calculation  Start Time: 0922  Stop Time: 1000  Time Calculation (min): 38 min    Insurance:  Visit:  1 of [place]  Auth required: No   Payor: MEDICAL [employer] [facility] / Plan: [employer]ICAL [employer] / Product Type: *No Product type* /     Assessment:  PT Assessment  PT Assessment Results: Decreased strength, Decreased range of motion, Pain  Rehab Prognosis: Good  Evaluation/Treatment Tolerance: Patient tolerated treatment well  Patient presents to physical therapy with c/o R knee pain. States that she bent her knee very far medially while hiking on 1/3/24. She has felt a constant, dull ache since then and fears it may be getting slightly worse. She did not feel a pop or fall when the injury occurred. Pain increases with activity. States she has a hx of R hip issues which she completed physical therapy for 8 years ago. No numbness or tingling. No falls. Exhibits TTP of medial R knee, decreased strength, (+) varus stress test, and pain with lateral patellar glide. S/s consistent with referring diagnosis.    Plan:  OP PT Plan  Treatment/Interventions: Cryotherapy, Dry needling, Education/ Instruction, Electrical stimulation, Gait training, Manual therapy, Hot pack, Neuromuscular re-education, Self care/ home management, Taping techniques, Therapeutic activities, Therapeutic exercises  PT Plan: Skilled PT  PT Frequency: 1 time per week  Duration: 12 weeks  Onset Date: 01/03/24  Rehab Potential: Good  Plan of Care Agreement: Patient    Current Problem:   1. Acute pain of right knee  Referral to Physical Therapy          Subjective    Patient presents to physical therapy for evaluation of R knee. States that she bent her knee very far medially while hiking on 1/3/24. She has felt a constant, dull ache since then and fears it may be getting slightly worse. She did not feel a " pop or fall when the injury occurred. Pain increases with activity. States she has a hx of R hip issues which she completed physical therapy for 8 years ago. Pt wants to return to being active. No numbness or tingling. No falls.     PREVIOUS INTERVENTION:  Ice/Advil sporadically    EXACERBATING FACTORS:  Jogging, walking long distances    RELIEVING FACTORS:  Rest    OCCUPATION:  [employer]    HOBBIES:  Running    HOME SETUP:  Stairs at home but pain does not increase    BARRIERS IMPACTING CARE:  N/A    General:  General  Reason for Referral: R knee pain  Referred By: Dr. Self    Precautions:  Precautions  JACLYN Fall Risk Score (The score of 4 or more indicates an increased risk of falling): 0  Medical Precautions: No known precautions/limitation (Medical hx reviewed. Not likely to impact care.)    Pain:  Pain Assessment  Pain Assessment: 0-10  Pain Score: 2  Pain Type: Acute pain  Pain Location: Knee  Pain Orientation: Right  Pain Descriptors: Aching, Dull  Pain Frequency: Constant/continuous    Prior Level of Function:  Prior Function Per Pt/Caregiver Report  Level of [place]: Independent with ADLs and functional transfers    Objective   OBSERVATION  Functional squat: discomfort in R knee   R SL squat discomfort in R knee     AROM  R knee flexion 138  R knee extension -1    L knee flexion 138  L knee extension -1    STRENGTH  R hip flexion 4/5  R hip extension 3+/5  R hip ABD 4/5  R hip ER 4/5  R hip IR 4/5    L hip flexion 4/5  L hip extension 3+/5  L hip ABD 4/5  L hip ER 4/5  L hip IR 4/5    PALPATION  TTP medial aspect of R knee, medial joint line of R knee    SPECIAL TESTS  R LE    (+) with lateral patellar glide  (+) varus stress  (-) valgus stress test  (-) Thessaly  (-) Anterior drawer    (-) Scour  (-) Fadir  (-) J Carlos    Outcome Measures:  Other Measures  Lower Extremity Funtional Score (LEFS): 60/80     TREATMENT  THERAPEUTIC EXERCISE:  Access Code: [email or web address]  URL:  [email or web address]  Date: 02/23/2024  Prepared by: Eduarda Garces    Exercises  - Supine Bridge  - 2 x daily - 7 x weekly - 1 sets - 10 reps  - Supine Active Straight Leg Raise  - 2 x daily - 7 x weekly - 1 sets - 10 reps  - Clamshell  - 2 x daily - 7 x weekly - 1 sets - 10 reps  - Sidelying Hip Abduction  - 2 x daily - 7 x weekly - 1 sets - 10 reps    EDUCATION:   Patient education on diagnosis/prognosis, pathophysiology, POC, and HEP.  Patient demonstrates understanding of HEP/POC.    Goals:  1. Pain 0/10  2. Outcomes Measure: LEFS 80/80  3. Improved LE strength to 5/5 so patient can walk for >30 mins without increase in symptoms  4. Demonstrates independence with HEP.    Care was provided for this patient by DAMIEN Cordon.

## 2024-03-01 ENCOUNTER — TREATMENT (OUTPATIENT)
Dept: PHYSICAL THERAPY | Facility: CLINIC | Age: 44
End: 2024-03-01
Payer: COMMERCIAL

## 2024-03-01 ENCOUNTER — APPOINTMENT (OUTPATIENT)
Dept: PHYSICAL THERAPY | Facility: CLINIC | Age: 44
End: 2024-03-01
Payer: COMMERCIAL

## 2024-03-01 DIAGNOSIS — M25.561 ACUTE PAIN OF RIGHT KNEE: Primary | ICD-10-CM

## 2024-03-01 PROCEDURE — 97110 THERAPEUTIC EXERCISES: CPT | Mod: GP | Performed by: PHYSICAL THERAPIST

## 2024-03-01 ASSESSMENT — PAIN - FUNCTIONAL ASSESSMENT: PAIN_FUNCTIONAL_ASSESSMENT: 0-10

## 2024-03-01 ASSESSMENT — PAIN SCALES - GENERAL: PAINLEVEL_OUTOF10: 2

## 2024-03-01 NOTE — PROGRESS NOTES
Physical Therapy    Physical Therapy Treatment    Patient Name: Kaylen Dalton  MRN: [id number]  Today's Date: 3/1/2024  Time Calculation  Start Time: 1055  Stop Time: 1118  Time Calculation (min): 23 min    Insurance:  Visit:  2 of SYEDA [place] required: No   Payor: MEDICAL [facility] / Plan: [employer]ICAL [employer] / Product Type: *No Product type* /    Assessment:   Reviewed proper form for HEP. Pt demonstrated good understanding. Discussed exercises that can be done throughout the workday to relieve soreness.     Plan:   Progress quad strengthening as tolerated. Initiate TG.     Current Problem  1. Acute pain of right knee          General   General  Reason for Referral: R knee pain  Referred By: Dr. Self    Subjective    Pt is more sore than she was at first visit. She feels the exercises feel very good but pain is moving from the medial side of the knee to the top of the knee.   Pt needs to leave for work, only able to do short session today.    Precautions  Precautions  Medical Precautions: No known precautions/limitation (Medical hx reviewed. Not likely to impact care.)    Pain  Pain Assessment  Pain Assessment: 0-10  Pain Score: 2    Objective   Not tested    TREATMENT  THERAPEUTIC EXERCISE:  Review of HEP  Bridges 10x  R Straight leg raise 5x  R Clamshells 5x  R sidelying hip abduction 5x   Standing R iso hip abduction into wall 5x  Clock tap 5x each  R quad stretch 30 sec    MANUAL THERAPY:    NEUROMUSCULAR RE-EDUCATION:    THERAPEUTIC ACTIVITY:    MODALITIES:    OP EDUCATION:   THERAPEUTIC EXERCISE:  Access Code: [email or web address]  URL: [email or web address]  Date: 02/23/2024  Prepared by: Eduarda Garces     Exercises  - Supine Bridge  - 2 x daily - 7 x weekly - 1 sets - 10 reps  - Supine Active Straight Leg Raise  - 2 x daily - 7 x weekly - 1 sets - 10 reps  - Clamshell  - 2 x daily - 7 x weekly - 1 sets - 10 reps  - Sidelying Hip Abduction  - 2 x daily - 7 x weekly - 1 sets - 10  reps    Goals:  1. Pain 0/10  2. Outcomes Measure: LEFS 80/80  3. Improved LE strength to 5/5 so patient can walk for >30 mins without increase in symptoms  4. Demonstrates independence with HEP.    Care was provided for this patient by DAMIEN Cordon.

## 2024-03-08 ENCOUNTER — APPOINTMENT (OUTPATIENT)
Dept: PHYSICAL THERAPY | Facility: CLINIC | Age: 44
End: 2024-03-08
Payer: COMMERCIAL

## 2024-03-13 ENCOUNTER — APPOINTMENT (OUTPATIENT)
Dept: RADIOLOGY | Facility: CLINIC | Age: 44
End: 2024-03-13
Payer: COMMERCIAL

## 2024-03-13 ENCOUNTER — OFFICE VISIT (OUTPATIENT)
Dept: ORTHOPEDIC SURGERY | Facility: CLINIC | Age: 44
End: 2024-03-13
Payer: COMMERCIAL

## 2024-03-13 DIAGNOSIS — S83.241D ACUTE MEDIAL MENISCAL TEAR, RIGHT, SUBSEQUENT ENCOUNTER: Primary | ICD-10-CM

## 2024-03-13 PROBLEM — M79.604 PAIN IN POSTERIOR RIGHT LOWER EXTREMITY: Status: RESOLVED | Noted: 2023-03-13 | Resolved: 2024-03-13

## 2024-03-13 PROBLEM — M25.552 LEFT HIP PAIN: Status: RESOLVED | Noted: 2023-03-13 | Resolved: 2024-03-13

## 2024-03-13 PROCEDURE — 99214 OFFICE O/P EST MOD 30 MIN: CPT | Performed by: PEDIATRICS

## 2024-03-13 PROCEDURE — 1036F TOBACCO NON-USER: CPT | Performed by: PEDIATRICS

## 2024-03-13 NOTE — PATIENT INSTRUCTIONS
MRI ORDERS:  An order for MRI has been placed for you. Please call [phone] to schedule at a  facility.   Should you choose to have it done outside of , you will need to bring a copy of the images on CD from the location you have it done.    An MRI (magnetic resonance imaging) is a special test that needs prior authorization from your insurance. The prior authorization is obtained by the location where you have the MRI done. Once you schedule the exam, the approval request  begins and may take 10 days. Before you have the study completed, confirm the MRI has been approved. If the test gets denied by your insurance, we may be able to contest the decision. Should you learn it has been denied, please call out office to let us know.   Once scheduled, please call [phone] to schedule follow up appointment to review the MRI with Dr. Goldberg. Sometimes this may be done virtually.      Diagnosis, evaluation, and treatment options were explained to patient in detail and questions answered.   Further treatment as discussed which may include surgery. If Mri suggests the need for surgical consult she will let you know.   If referred-->please call [phone] to schedule with Dr. Novak or Dr. Youngblood (Dr Miller).      Call Pediatric Sports Medicine Office @ [phone] if any difficulty or for any further concerns

## 2024-03-13 NOTE — PROGRESS NOTES
A report with my findings and recommendations will be sent to the Kayy Self DO via written or electronic means when information is available    History of Present Illness:  Kaylen aDlton is a 43 y.o. female here for f/up of   1. Acute medial meniscal tear, right, subsequent encounter  MR knee right wo IV contrast          3/13/2024 UPDATE: no improvement with PT. Tried to hike this weekend and had pain     Prior Treatment:   Physical Therapy  Yes- 3 weeks of formal and hep without relief-- more sore after  Medications Yes - nsaids  Modified activities/sports Yes - limited activity due to pain    Past MSK HX:  Specialty Problems    None    Medications:   Current Outpatient Medications on File Prior to Visit   Medication Sig Dispense Refill    fluticasone (Flonase) 50 mcg/actuation nasal spray Administer 1-2 sprays into each nostril once daily.      levothyroxine (Synthroid, Levoxyl) 100 mcg tablet Take 1 tablet (100 mcg) by mouth once daily. 30 tablet 3    loratadine (Claritin) 10 mg tablet Take 1 tablet (10 mg) by mouth once daily.      multivitamin tablet Take 1 tablet by mouth once daily.      norethindrone-e.estradioL-iron (Microgestin FE 1.5/30) 1.5 mg-30 mcg (21)/75 mg (7) tablet Take 1 tablet by mouth once daily. 84 tablet 3    traZODone (Desyrel) 50 mg tablet Take 1.5 tablets (75 mg) by mouth once daily at bedtime. 135 tablet 1     No current facility-administered medications on file prior to visit.         Allergies:    Allergies   Allergen Reactions    Erythromycin Nausea Only        Physical Exam:  General appearance: Well-appearing well-nourished  Psych: Normal mood and affect    no soft tissue swelling, redness, or warmth  no skin changes  no deformities    FUNCTIONAL:  Gait normal without limp  Single leg standing balance--> good  SL semi squat with twist feels shaky but not painful  Knee: Full PROM without PAIN KASHIF    PALPATION:  Effusion: trace, mild ballotment  Peripatellar: no TTP,  Neg Grind, normal glide, neg tilt, neg apprehension  Joint line: anterior medial TTP  Quad tendon: WNL, NTTP  Patella tendon: WNL, NTTP  McMurrays --> pain anterior medial load, no catch    Imaging: No new radiographs were obtained today.  === 01/31/24 ===    XR KNEE RIGHT 4+ VIEWS    - Impression -  Normal radiographs of the right knee      MACRO:  None    Signed by: Sravan Kan 2/1/2024 5:54 PM  Dictation workstation:   [id number]     Impression and Plan:  Kaylen Dalton is a 43 y.o. female here for f/up of   1. Acute medial meniscal tear, right, subsequent encounter  MR knee right wo IV contrast         PLAN/FOLLOW UP:    MRI to evaluate for meniscal tear  Dr Novak or Brent after MRI     DIagnosis, evaluation, and treatment options were explained to patient in detail and questions answered.   A detailed handout was provided to patient with further information on diagnosis, evaluation, and treatment.   Home exercises were explained and included on the sheet.  Further treatment as discussed.    Call Pediatric Sports Medicine Office [phone] if not improving as expected or any further concern.      ** Please excuse any errors in grammar or translation related to this dictation. Voice recognition software was utilized to prepare this document. **

## 2024-03-15 ENCOUNTER — APPOINTMENT (OUTPATIENT)
Dept: RADIOLOGY | Facility: CLINIC | Age: 44
End: 2024-03-15
Payer: COMMERCIAL

## 2024-03-15 ENCOUNTER — HOSPITAL ENCOUNTER (OUTPATIENT)
Dept: RADIOLOGY | Facility: CLINIC | Age: 44
Discharge: HOME | End: 2024-03-15
Payer: COMMERCIAL

## 2024-03-15 ENCOUNTER — APPOINTMENT (OUTPATIENT)
Dept: PHYSICAL THERAPY | Facility: CLINIC | Age: 44
End: 2024-03-15
Payer: COMMERCIAL

## 2024-03-15 DIAGNOSIS — S83.241D ACUTE MEDIAL MENISCAL TEAR, RIGHT, SUBSEQUENT ENCOUNTER: ICD-10-CM

## 2024-03-15 PROCEDURE — 73721 MRI JNT OF LWR EXTRE W/O DYE: CPT | Mod: RT

## 2024-03-15 PROCEDURE — 73721 MRI JNT OF LWR EXTRE W/O DYE: CPT | Mod: RIGHT SIDE | Performed by: RADIOLOGY

## 2024-03-19 ENCOUNTER — TELEMEDICINE (OUTPATIENT)
Dept: ORTHOPEDIC SURGERY | Facility: CLINIC | Age: 44
End: 2024-03-19
Payer: COMMERCIAL

## 2024-03-19 DIAGNOSIS — M22.2X1 PATELLOFEMORAL PAIN SYNDROME OF RIGHT KNEE: Primary | ICD-10-CM

## 2024-03-19 PROCEDURE — 1036F TOBACCO NON-USER: CPT | Performed by: PEDIATRICS

## 2024-03-19 PROCEDURE — 99213 OFFICE O/P EST LOW 20 MIN: CPT | Performed by: PEDIATRICS

## 2024-03-19 NOTE — PATIENT INSTRUCTIONS
PLAN/FOLLOW UP:    Trial debo-pull lite brace  Alleve 2 tabs twice daily x 2 weeks  Ice after activity  F/up 4-6 weeks if not improving   Consider steroid injection  CHONDROMALACIA PATELLAE:  What is it?  Pain in the front of your knee due to irregular tracking or increased pressure of your kneecap (patella) on your thigh bone. The patella may pinch the soft tissues causing swelling and pain in the front of knee  Causes include malalignment, trauma, history of patella dislocation/subluxation, and functional malalignment due to:  Poor activation or weakness of core/buttock muscles  Imbalance of the anterior/posterior thigh muscles   Ankle pronation (roll in)     Signs and Symptoms:  Anterior knee pain that worsens during or after activity (running, stairs, jumping, etc)  Pain may be achy or sharp. Often worsens or stiffens with prolonged sitting.  Occasionally, you may feel a giving way of the knee, grinding or catching sensation   Treatment:  Primary treatment is to correct alignment during activity with hip and core strengthening  Physical Therapy to improve strength/flexibility, teach appropriate mechanics, and create a home exercise program you can do on own 5+ days a week  Continue regular exercise as able (biking, swimming, or elliptical are good for cardio)  Weight lifting/core/plyometric exercise (align knee behind your toes and pointed toward little toe)  Pain Modification:  Activity modification to allow symptoms to calm down, otherwise activity to tolerance  Ice 10-15 minutes after activity. (Ice cups for massage 5-7min)   Anti-inflammatory medications (NSAIDs) may help if pain is constant   Naproxen (220mg) 1-2 tabs twice daily 10-14 days, then as needed for pain  If patient is less than 12 years old, check for proper dosage with doctor  Arch supports and bracing may help but are often not prescribed initially:  If your ankle pronatesà Shoe inserts with arch support may help during exercise   Try  semi-rigid brands that bends slightly but is not floppy  Brands include POWERSTEP, SUPERFEET, SPENCO available at [employer], [employer], [employer], [employer], etc or online    Reaction Brace, Thierno-pull lite, J Brace are available through doctor or online @ [employer].       Home Exercises to Start:  GLUTEAL AND HIP ACTIVATION PREHAB EXERCISES   Reprinted from Matias Reyes, CPT, VAN, CSCS  123 4    1. Tabletop Heel Lift  Start in a tabletop or quadruped position with the wrist aligned directly under the shoulder joints and the kneecaps directly beneath the hip sockets. Pull the belly button into the spine to engage the abdominals and hold the face parallel to the floor in order to create a neutral and stable spine. Next, drive one heel up into the lisa as high as possible while keeping the knee bent at 90 degrees and maintaining a neutral spine. Do not allow your lower back to arch. Continue to pull the belly button into the spine s you press the heel into the lisa.  Perform 5-10 reps on each leg.    2. Hip Hike  Lie on your side with the bottom elbow, hip and anklebone all aligned in a straight line on the floor. Make sure the elbow is directly beneath the shoulder joint. Next, press the hips up into the lisa until the spine and legs are aligned in a straight line. Then lower down slowly and repeat several more times. This exercise will help activate the Gluteus Medius, which is located on the lateral side of the hip. If too difficult, just hold side plank for 10 seconds and repeat.   Perform 5-10 reps on each side.    3. Side Plank (+/- Hip Abduction) -add once can do side plank and hip hikes without difficulty.  Lie on your side with the bottom elbow, hip and anklebone all aligned in a straight line on the floor. Make sure the elbow is directly beneath the shoulder joint. Next, press the hips up into the lisa until the spine and legs are aligned in a straight line. Once you can do this well and hold for 30  seconds, then add the leg abduction.   Now, begin to lift and lower the top leg several times while maintaining a straight-line alignment with the bottom shoulder, hip and ankle. Do not let your hips drop towards the floor or hinge backwards. Maintain a neutral spine and move with control. This exercise will help activate the Gluteus Medius, which will serve to protect the ACL.  Perform 5-10 abductions with each leg.      4. Bridge March  Lie on the floor with the knees bent at 90 degrees and the forefoot (toes) off the floor. Press the hips up into the air until they align with the knees and shoulders in a straight line. Next, begin to march the legs by reaching one knee up into the lisa at a time. Make sure that the hips remain level with the floor. Do not allow one hip to drop towards the floor while marching; this is a sign of instability in the hip socket. Also, keep a neutral spine and do not arch in the lower back.  Perform 10-15 marches with each leg.                 [id number]      5. Single-Leg Bridge  Lie on the floor with the knees bent at 90 degrees and the forefoot (toes) off the floor. Press the hips up into the air until they align with the knees and shoulders in a straight line. Next, pull on leg into the torso and hug the kneecap tight into the heart. Then press into the floor with the opposite heel and drive the hips up into the air as high as possible. Lower down slowly and repeat several more times. Make sure that the lower back does not arch or over-extend and squeeze the leg into the chest as much as possible as this will lock the pelvis from 'rolling' when bridging. This exercise will help activate the Gluteus Rubén and Minimus as well as the Piriformis.  Perform 5-10 bridges with each leg.    6. Clams  Lie on your side with a small resistance band placed around your thighs or shine, as close to the knees as possible. For best results, position your body up against a wall with both the hips and  heels touching the wall. Next, pull the knees apart from one another while keeping the heels touching and maintaining a neutral spine. Open the knees as far as possible on each and every rep and close them in a slow and controlled manner. This exercise will help to activate the Gluteus Medius and protect the ACL.  Perform 5-10 reps on each side.    7. Air Squat with Bands  Stand with the feet shoulder width apart and wrap a small resistance band around the legs as close as possible to the knees. Next, squat the hips down to the floor as low as possible and then return to standing. Repeat this movement several times and on each rep, actively press the knee out wide against the resistance band in order to help activate more muscles in the hips. Perform 10-15 reps      8. Band Walks: Forward & Lateral   an athletic position with the feet shoulder width apart and wrap a small resistance band around the legs as close as possible to the knees. Next, walk forward while keeping the feet at shoulder width apart. Then, return to the same spot be walking backwards and keeping the feet at shoulder width apart. Next, walk to the side for several steps. Step out as far as possible on each step and then return to the same starting position. These exercises will help activate the Gluteus Medius and protect the ACL from injury. Walk 10-20 steps in each direction.

## 2024-03-19 NOTE — PROGRESS NOTES
A report with my findings and recommendations will be sent to the Kayy Self DO via written or electronic means when information is available    History of Present Illness:  Kaylen Dalton is a 43 y.o. female here for virtual f/up of knee mri.    3/19/2024 UPDATE: doing well. No change since last visit    Past rx: occ nsaids  No recent icing  No brace  Starts PT 3/29/24 at [facility] office    Past MSK HX:  Specialty Problems    None    Medications:   Current Outpatient Medications on File Prior to Visit   Medication Sig Dispense Refill    fluticasone (Flonase) 50 mcg/actuation nasal spray Administer 1-2 sprays into each nostril once daily.      levothyroxine (Synthroid, Levoxyl) 100 mcg tablet Take 1 tablet (100 mcg) by mouth once daily. 30 tablet 3    loratadine (Claritin) 10 mg tablet Take 1 tablet (10 mg) by mouth once daily.      multivitamin tablet Take 1 tablet by mouth once daily.      norethindrone-e.estradioL-iron (Microgestin FE 1.5/30) 1.5 mg-30 mcg (21)/75 mg (7) tablet Take 1 tablet by mouth once daily. 84 tablet 3    traZODone (Desyrel) 50 mg tablet Take 1.5 tablets (75 mg) by mouth once daily at bedtime. 135 tablet 1     No current facility-administered medications on file prior to visit.         Allergies:    Allergies   Allergen Reactions    Erythromycin Nausea Only        Physical Exam:  General appearance: Well-appearing well-nourished  Psych: Normal mood and affect    Deferred. Virtual visit      Imaging: MRI results reviewed.    Impression and Plan:  Kaylen Dalton is a 43 y.o. female here for f/up of   1. Patellofemoral pain syndrome of right knee          PLAN/FOLLOW UP:    Trial debo-pull lite brace  Alleve 2 tabs twice daily x 2 weeks  Ice after activity  F/up 4-6 weeks if not imprving     DIagnosis, evaluation, and treatment options were explained to patient in detail and questions answered.   A detailed handout was provided to patient with further information on diagnosis,  evaluation, and treatment.   Home exercises were explained and included on the sheet.  Further treatment as discussed.    Call Pediatric Sports Medicine Office [phone] if not improving as expected or any further concern.      ** Please excuse any errors in grammar or translation related to this dictation. Voice recognition software was utilized to prepare this document. **

## 2024-03-25 ENCOUNTER — PATIENT MESSAGE (OUTPATIENT)
Dept: PRIMARY CARE | Facility: CLINIC | Age: 44
End: 2024-03-25
Payer: COMMERCIAL

## 2024-03-25 DIAGNOSIS — G47.00 INSOMNIA, UNSPECIFIED TYPE: Primary | ICD-10-CM

## 2024-03-25 RX ORDER — TRAZODONE HYDROCHLORIDE 50 MG/1
75 TABLET ORAL NIGHTLY PRN
Qty: 45 TABLET | Refills: 5 | Status: SHIPPED | OUTPATIENT
Start: 2024-03-25 | End: 2025-03-25

## 2024-03-29 ENCOUNTER — TREATMENT (OUTPATIENT)
Dept: PHYSICAL THERAPY | Facility: CLINIC | Age: 44
End: 2024-03-29
Payer: COMMERCIAL

## 2024-03-29 DIAGNOSIS — M25.561 ACUTE PAIN OF RIGHT KNEE: Primary | ICD-10-CM

## 2024-03-29 DIAGNOSIS — G47.00 INSOMNIA, UNSPECIFIED TYPE: ICD-10-CM

## 2024-03-29 PROCEDURE — 97110 THERAPEUTIC EXERCISES: CPT | Mod: GP | Performed by: PHYSICAL THERAPIST

## 2024-03-29 RX ORDER — TRAZODONE HYDROCHLORIDE 50 MG/1
50 TABLET ORAL NIGHTLY PRN
Qty: 90 TABLET | Refills: 1 | OUTPATIENT
Start: 2024-03-29 | End: 2025-03-29

## 2024-03-29 ASSESSMENT — PAIN SCALES - GENERAL: PAINLEVEL_OUTOF10: 0 - NO PAIN

## 2024-03-29 ASSESSMENT — PAIN - FUNCTIONAL ASSESSMENT: PAIN_FUNCTIONAL_ASSESSMENT: 0-10

## 2024-03-29 NOTE — PROGRESS NOTES
Physical Therapy    Physical Therapy Treatment    Patient Name: Kaylen Dalton  MRN: [id number]  Today's Date: 3/29/2024  Time Calculation  Start Time: 0918  Stop Time: 0945  Time Calculation (min): 27 min    Insurance:  Visit:  3 Xochitl Irwin required: No   Payor: MEDICAL [facility] / Plan: MEDICAL [employer] / Product Type: *No Product type* /    Assessment:  Cut visit short to allow patient to get set up with brace in ortho and get to work on time.  Demonstrates understanding of HEP.  Reports fatigue with side stepping with band.    Plan:  Follow up in 1 month to progress strengthening.  Initiate return to run as appropriate.    Current Problem  1. Acute pain of right knee            General   General  Reason for Referral: R knee pain  Referred By: Dr. Self    Subjective    States she continued to have knee pain since last visit even with just brisk walking.  Saw Dr. Goldberg again and had MRI performed.  MRI clean for any soft tissue involvement.  Minimal PF OA.  Was told she could get a brace in therapy today.    Precautions  Precautions  Medical Precautions: No known precautions/limitation (Medical hx reviewed. Not likely to impact care.)    Pain  Pain Assessment  Pain Assessment: 0-10  Pain Score: 0 - No pain    Objective   Not tested    TREATMENT  THERAPEUTIC EXERCISE:  Review of HEP  Bridges 10x  R Clamshells 5x  R sidelying hip abduction 5x   GTB side step 2 laps    MANUAL THERAPY:      NEUROMUSCULAR RE-EDUCATION:      THERAPEUTIC ACTIVITY:      MODALITIES:      OP EDUCATION:   THERAPEUTIC EXERCISE:  Access Code: [email or web address]  URL: [email or web address]  Date: 03/29/2024  Prepared by: Eduarda Garces    Exercises  - Supine Bridge  - 2 x daily - 7 x weekly - 1 sets - 10 reps  - Supine Active Straight Leg Raise  - 2 x daily - 7 x weekly - 1 sets - 10 reps  - Clamshell  - 2 x daily - 7 x weekly - 1 sets - 10 reps  - Sidelying Hip Abduction  - 2 x daily - 7 x weekly - 1 sets - 10  reps  - Quadricep Stretch with Chair and Counter Support  - 2 x daily - 7 x weekly - 1 sets - 10 reps  - Standing Isometric Hip Abduction with Ball on Wall  - 2 x daily - 7 x weekly - 1 sets - 10 reps  - Single Leg Balance with Clock Reach  - 2 x daily - 7 x weekly - 1 sets - 10 reps  - Side Stepping with Resistance at Feet  - 2 x daily - 7 x weekly - 1 sets - 10 reps    Goals:  1. Pain 0/10  2. Outcomes Measure: LEFS 80/80  3. Improved LE strength to 5/5 so patient can walk for >30 mins without increase in symptoms  4. Demonstrates independence with HEP.

## 2024-04-09 ENCOUNTER — OFFICE VISIT (OUTPATIENT)
Dept: PRIMARY CARE | Facility: CLINIC | Age: 44
End: 2024-04-09
Payer: COMMERCIAL

## 2024-04-09 VITALS
TEMPERATURE: 98.1 F | OXYGEN SATURATION: 100 % | WEIGHT: 149 LBS | BODY MASS INDEX: 22.66 KG/M2 | DIASTOLIC BLOOD PRESSURE: 62 MMHG | SYSTOLIC BLOOD PRESSURE: 110 MMHG | HEART RATE: 61 BPM | RESPIRATION RATE: 16 BRPM

## 2024-04-09 DIAGNOSIS — K58.9 IRRITABLE BOWEL SYNDROME, UNSPECIFIED TYPE: ICD-10-CM

## 2024-04-09 DIAGNOSIS — K64.4 EXTERNAL HEMORRHOIDS: Primary | ICD-10-CM

## 2024-04-09 DIAGNOSIS — K62.5 RECTAL BLEEDING: ICD-10-CM

## 2024-04-09 PROCEDURE — 99214 OFFICE O/P EST MOD 30 MIN: CPT | Performed by: FAMILY MEDICINE

## 2024-04-09 RX ORDER — HYDROCORTISONE 25 MG/G
CREAM TOPICAL 4 TIMES DAILY PRN
Qty: 30 G | Refills: 0 | Status: SHIPPED | OUTPATIENT
Start: 2024-04-09 | End: 2025-04-09

## 2024-04-09 NOTE — PROGRESS NOTES
Subjective   Patient ID: Arpita Dalton is a 43 y.o. female who presents for Hemorrhoids.    She has had some rectal bleeding now and again with her stool.  She had a colonoscopy and was told she has hemorrhoids.  She thinks it was right before the pandemic.  Then 2 weeks ago she started having a lot of blood in stool, more straining, uncomfortable, she tried some miralax it helped a little.  She tried preparation H otc.  She hasn't tried it before but it was causing more burning.  The blood is in the stool it's self it isn't bloody when she goes but there is some when she wipes.  She had blood in the stool this morning evening.           Review of Systems    Objective   /62   Pulse 61   Temp 36.7 °C (98.1 °F)   Resp 16   Wt 67.6 kg (149 lb)   LMP 04/09/2024   SpO2 100%   BMI 22.66 kg/m²     Physical Exam  Constitutional:       Appearance: Normal appearance.   HENT:      Head: Normocephalic and atraumatic.      Nose: Nose normal.      Mouth/Throat:      Mouth: Mucous membranes are moist.   Abdominal:      Palpations: Abdomen is soft.   Genitourinary:     Rectum: Normal.      Comments: Very minimal external hemorrhoid tissue, no bleeding, no fissues or fistulas  Neurological:      General: No focal deficit present.      Mental Status: She is alert and oriented to person, place, and time. Mental status is at baseline.   Psychiatric:         Mood and Affect: Mood normal.         Behavior: Behavior normal.         Assessment/Plan   Diagnoses and all orders for this visit:  External hemorrhoids  -     hydrocortisone (Anusol-HC) 2.5 % rectal cream; Insert into the rectum 4 times a day as needed for hemorrhoids (rectal discomfort). Apply to affected areas  Rectal bleeding  Irritable bowel syndrome, unspecified type  -     Referral to Gastroenterology; Future

## 2024-04-09 NOTE — PATIENT INSTRUCTIONS
Today we have addressed your rectal bleeding and hemorrhoids  I have advised that you continue miralax 1/2 capful daily and continue drinking plenty of water.    I have prescribed a topical for you to use after BM's and would like you to follow up with GI since you have seen actual blood in the stool as well.  Referral placed.

## 2024-05-03 ENCOUNTER — TELEPHONE (OUTPATIENT)
Dept: PRIMARY CARE | Facility: CLINIC | Age: 44
End: 2024-05-03

## 2024-05-03 ENCOUNTER — TELEPHONE (OUTPATIENT)
Dept: PRIMARY CARE | Facility: CLINIC | Age: 44
End: 2024-05-03
Payer: COMMERCIAL

## 2024-05-03 ENCOUNTER — TREATMENT (OUTPATIENT)
Dept: PHYSICAL THERAPY | Facility: CLINIC | Age: 44
End: 2024-05-03
Payer: COMMERCIAL

## 2024-05-03 DIAGNOSIS — M25.561 ACUTE PAIN OF RIGHT KNEE: Primary | ICD-10-CM

## 2024-05-03 DIAGNOSIS — E78.5 HYPERLIPIDEMIA, UNSPECIFIED HYPERLIPIDEMIA TYPE: ICD-10-CM

## 2024-05-03 DIAGNOSIS — E03.9 HYPOTHYROIDISM, UNSPECIFIED TYPE: Primary | ICD-10-CM

## 2024-05-03 PROCEDURE — 97110 THERAPEUTIC EXERCISES: CPT | Mod: GP | Performed by: PHYSICAL THERAPIST

## 2024-05-03 ASSESSMENT — PAIN SCALES - GENERAL: PAINLEVEL_OUTOF10: 0 - NO PAIN

## 2024-05-03 ASSESSMENT — PAIN - FUNCTIONAL ASSESSMENT: PAIN_FUNCTIONAL_ASSESSMENT: 0-10

## 2024-05-03 NOTE — TELEPHONE ENCOUNTER
Pt called in regards to blood work for levothyroxine. Pt called and stated she normally get blood work done after a certain amount of time and Dr. PARHAM gives her a call back with the results. Pt called to make sure the lab orders where in there so she could come in within the next couple of weeks but they where not .

## 2024-05-03 NOTE — TELEPHONE ENCOUNTER
Pt called in regards to blood work for levothyroxine pt stated that she normally comes in a few week after apt to get blood work for medication and Dr. PARHAM gives her a call when the results come back in pt called to make sure the labs where good to go but there where no lab orders in her chart

## 2024-05-03 NOTE — PROGRESS NOTES
Physical Therapy    Physical Therapy Treatment    Patient Name: Kaylen Dalton  MRN: [id number]  Today's Date: 5/3/2024  Time Calculation  Start Time: 1412  Stop Time: 1450  Time Calculation (min): 38 min    Insurance:  Visit:  4 of SYEDA Irwin required: No   Payor: MEDICAL [facility] / Plan: MEDICAL [employer] / Product Type: *No Product type* /    Assessment:  Addressed all concerns about hip strengthening for prolonged maintenance program.  Discussed core activation as it applies to minimizing hip / knee pain with running.  Demonstrates understanding of HEP.    Plan:  Demonstrates ability to continue with independent home program.  Discharge to [facility].     Current Problem  1. Acute pain of right knee              General   General  Reason for Referral: R knee pain  Referred By: Dr. Self    Subjective    Knee has been feeling a lot better.  Only needed to use brace once.  Has also been able to resume jogging intervals.  Would like to review HEP and discuss maintenance program.    Precautions  Precautions  Medical Precautions: No known precautions/limitation (Medical hx reviewed. Not likely to impact care.)    Pain  Pain Assessment  Pain Assessment: 0-10  Pain Score: 0 - No pain    Objective   (-) hip screening    TREATMENT  THERAPEUTIC EXERCISE:  TA training  Bridges with marches 10x  R Clamshells 10  R sidelying hip abduction 10x  Squat training   SL DL with 5# KB 10x ea    MANUAL THERAPY:      NEUROMUSCULAR RE-EDUCATION:      THERAPEUTIC ACTIVITY:      MODALITIES:      OP EDUCATION:   Access Code: [email or web address]  URL: [email or web address]  Date: 05/03/2024  Prepared by: Eduarda Garces    Exercises  - Supine Bridge  - 2 x daily - 7 x weekly - 1 sets - 10 reps  - Supine Active Straight Leg Raise  - 2 x daily - 7 x weekly - 1 sets - 10 reps  - Clamshell  - 2 x daily - 7 x weekly - 1 sets - 10 reps  - Sidelying Hip Abduction  - 2 x daily - 7 x weekly - 1 sets - 10 reps  - Quadricep Stretch  with Chair and Counter Support  - 2 x daily - 7 x weekly - 1 sets - 10 reps  - Standing Isometric Hip Abduction with Ball on Wall  - 2 x daily - 7 x weekly - 1 sets - 10 reps  - Single Leg Balance with Clock Reach  - 2 x daily - 7 x weekly - 1 sets - 10 reps  - Side Stepping with Resistance at Feet  - 2 x daily - 7 x weekly - 1 sets - 10 reps  - Marching Bridge  - 2 x daily - 7 x weekly - 1 sets - 10 reps  - Squat with Chair Touch  - 2 x daily - 7 x weekly - 1 sets - 10 reps  - Single Leg Deadlift with Kettlebell  - 2 x daily - 7 x weekly - 1 sets - 10 reps  - Supine Transversus Abdominis Bracing - Hands on Stomach  - 1 x daily - 7 x weekly - 3 sets - 10 reps    Goals:  1. Pain 0/10  2. Outcomes Measure: LEFS 80/80  3. Improved LE strength to 5/5 so patient can walk for >30 mins without increase in symptoms  4. Demonstrates independence with HEP.

## 2024-05-07 ENCOUNTER — PATIENT MESSAGE (OUTPATIENT)
Dept: PRIMARY CARE | Facility: CLINIC | Age: 44
End: 2024-05-07
Payer: COMMERCIAL

## 2024-05-09 ENCOUNTER — LAB (OUTPATIENT)
Dept: LAB | Facility: LAB | Age: 44
End: 2024-05-09
Payer: COMMERCIAL

## 2024-05-09 DIAGNOSIS — E03.9 HYPOTHYROIDISM, UNSPECIFIED TYPE: ICD-10-CM

## 2024-05-09 DIAGNOSIS — E78.5 HYPERLIPIDEMIA, UNSPECIFIED HYPERLIPIDEMIA TYPE: ICD-10-CM

## 2024-05-09 LAB
CHOLEST SERPL-MCNC: 220 MG/DL (ref 0–199)
CHOLESTEROL/HDL RATIO: 3.4
HDLC SERPL-MCNC: 64.9 MG/DL
LDLC SERPL CALC-MCNC: 138 MG/DL
NON HDL CHOLESTEROL: 155 MG/DL (ref 0–149)
T4 FREE SERPL-MCNC: 1.13 NG/DL (ref 0.61–1.12)
TRIGL SERPL-MCNC: 88 MG/DL (ref 0–149)
TSH SERPL-ACNC: 2.58 MIU/L (ref 0.44–3.98)
VLDL: 18 MG/DL (ref 0–40)

## 2024-05-09 PROCEDURE — 84439 ASSAY OF FREE THYROXINE: CPT

## 2024-05-09 PROCEDURE — 36415 COLL VENOUS BLD VENIPUNCTURE: CPT

## 2024-05-09 PROCEDURE — 80061 LIPID PANEL: CPT

## 2024-05-09 PROCEDURE — 84443 ASSAY THYROID STIM HORMONE: CPT

## 2024-05-10 ENCOUNTER — APPOINTMENT (OUTPATIENT)
Dept: PRIMARY CARE | Facility: CLINIC | Age: 44
End: 2024-05-10
Payer: COMMERCIAL

## 2024-05-24 ENCOUNTER — OFFICE VISIT (OUTPATIENT)
Dept: PRIMARY CARE | Facility: CLINIC | Age: 44
End: 2024-05-24
Payer: COMMERCIAL

## 2024-05-24 VITALS
WEIGHT: 148.13 LBS | SYSTOLIC BLOOD PRESSURE: 100 MMHG | TEMPERATURE: 98.2 F | DIASTOLIC BLOOD PRESSURE: 70 MMHG | HEIGHT: 68 IN | BODY MASS INDEX: 22.45 KG/M2 | HEART RATE: 73 BPM

## 2024-05-24 DIAGNOSIS — E03.9 HYPOTHYROIDISM, UNSPECIFIED TYPE: ICD-10-CM

## 2024-05-24 DIAGNOSIS — E78.5 HYPERLIPIDEMIA, UNSPECIFIED HYPERLIPIDEMIA TYPE: Primary | ICD-10-CM

## 2024-05-24 DIAGNOSIS — Z30.9 ENCOUNTER FOR CONTRACEPTIVE MANAGEMENT, UNSPECIFIED TYPE: ICD-10-CM

## 2024-05-24 PROCEDURE — 1036F TOBACCO NON-USER: CPT | Performed by: FAMILY MEDICINE

## 2024-05-24 PROCEDURE — 99214 OFFICE O/P EST MOD 30 MIN: CPT | Performed by: FAMILY MEDICINE

## 2024-05-24 RX ORDER — ATORVASTATIN CALCIUM 10 MG/1
5 TABLET, FILM COATED ORAL NIGHTLY
Qty: 45 TABLET | Refills: 1 | Status: SHIPPED | OUTPATIENT
Start: 2024-05-24 | End: 2025-05-24

## 2024-05-24 RX ORDER — LEVOTHYROXINE SODIUM 100 UG/1
100 TABLET ORAL DAILY
Qty: 90 TABLET | Refills: 1 | Status: SHIPPED | OUTPATIENT
Start: 2024-05-24 | End: 2025-05-24

## 2024-05-24 NOTE — PATIENT INSTRUCTIONS
Today we reviewed your thyroid labs and I have refilled your medication for you.      We have also addressed your cholesterol and will start you on a very lose dose statin (1/2 pill) at bedtime.   Follow up in 3 months to recheck.      Lastly for your congestion/allergies I recommend starting flonase.

## 2024-05-24 NOTE — PROGRESS NOTES
"Subjective   Patient ID: Arpita Dalton is a 43 y.o. female who presents for Medication Visit (Med follow up - had bw done too/Thyroid med).    Stress level is too high, HDL has decreased, LDL has increased.    She does try to eat healthy but cannot do totally plant based.    She does yoga, walking, some jogging, swimming     She also doesn't like the ocp's she has noticed a decreased sex drive.  She has been on them for 2 months.  We did a lower dose estrogen for her this time around -          Review of Systems    Objective   /70   Pulse 73   Temp 36.8 °C (98.2 °F)   Ht 1.727 m (5' 8\")   Wt 67.2 kg (148 lb 2 oz)   BMI 22.52 kg/m²     Physical Exam  Constitutional:       Appearance: Normal appearance.   HENT:      Head: Normocephalic and atraumatic.      Nose: Congestion and rhinorrhea present.   Eyes:      Pupils: Pupils are equal, round, and reactive to light.   Neck:      Thyroid: No thyroid mass, thyromegaly or thyroid tenderness.   Cardiovascular:      Rate and Rhythm: Normal rate and regular rhythm.   Pulmonary:      Effort: Pulmonary effort is normal.      Breath sounds: Normal breath sounds.   Musculoskeletal:      Cervical back: Normal range of motion and neck supple.   Skin:     General: Skin is warm and dry.      Capillary Refill: Capillary refill takes less than 2 seconds.   Neurological:      General: No focal deficit present.      Mental Status: She is alert and oriented to person, place, and time.   Psychiatric:         Mood and Affect: Mood normal.         Behavior: Behavior normal.           Assessment/Plan   Diagnoses and all orders for this visit:  Hyperlipidemia, unspecified hyperlipidemia type  -     atorvastatin (Lipitor) 10 mg tablet; Take 0.5 tablets (5 mg) by mouth once daily at bedtime.  Hypothyroidism, unspecified type  -     levothyroxine (Synthroid, Levoxyl) 100 mcg tablet; Take 1 tablet (100 mcg) by mouth once daily.         "

## 2024-06-04 ENCOUNTER — HOSPITAL ENCOUNTER (OUTPATIENT)
Dept: RADIOLOGY | Facility: CLINIC | Age: 44
Discharge: HOME | End: 2024-06-04
Payer: COMMERCIAL

## 2024-06-04 VITALS — BODY MASS INDEX: 22.43 KG/M2 | HEIGHT: 68 IN | WEIGHT: 148 LBS

## 2024-06-04 DIAGNOSIS — Z12.31 BREAST CANCER SCREENING BY MAMMOGRAM: ICD-10-CM

## 2024-06-04 PROCEDURE — 77067 SCR MAMMO BI INCL CAD: CPT | Performed by: RADIOLOGY

## 2024-06-04 PROCEDURE — 77067 SCR MAMMO BI INCL CAD: CPT

## 2024-06-04 PROCEDURE — 77063 BREAST TOMOSYNTHESIS BI: CPT | Performed by: RADIOLOGY

## 2024-09-15 DIAGNOSIS — G47.00 INSOMNIA, UNSPECIFIED TYPE: ICD-10-CM

## 2024-09-16 RX ORDER — TRAZODONE HYDROCHLORIDE 50 MG/1
TABLET ORAL
Qty: 135 TABLET | Refills: 0 | Status: SHIPPED | OUTPATIENT
Start: 2024-09-16

## 2024-09-16 NOTE — TELEPHONE ENCOUNTER
Refill approved but needs appt for additional refills. Please check last appointment and book their 6 month follow up or CPE whichever they are due for.

## 2024-09-17 NOTE — TELEPHONE ENCOUNTER
Pt is scheduled for 10/23/24 and would like to have bw done prior to appt   She states that she was put on a Statin 6mths ago and also bw for her thyroid  Please Call Pt once drafted  Thanks  Juan

## 2024-09-20 DIAGNOSIS — E78.5 HYPERLIPIDEMIA, UNSPECIFIED HYPERLIPIDEMIA TYPE: Primary | ICD-10-CM

## 2024-09-20 DIAGNOSIS — E06.3 HASHIMOTO'S THYROIDITIS: ICD-10-CM

## 2024-10-09 ENCOUNTER — LAB (OUTPATIENT)
Dept: LAB | Facility: LAB | Age: 44
End: 2024-10-09
Payer: COMMERCIAL

## 2024-10-09 DIAGNOSIS — E78.5 HYPERLIPIDEMIA, UNSPECIFIED HYPERLIPIDEMIA TYPE: ICD-10-CM

## 2024-10-09 DIAGNOSIS — E06.3 HASHIMOTO'S THYROIDITIS: ICD-10-CM

## 2024-10-09 LAB
ALBUMIN SERPL BCP-MCNC: 4.1 G/DL (ref 3.4–5)
ALP SERPL-CCNC: 34 U/L (ref 33–110)
ALT SERPL W P-5'-P-CCNC: 19 U/L (ref 7–45)
ANION GAP SERPL CALC-SCNC: 15 MMOL/L (ref 10–20)
AST SERPL W P-5'-P-CCNC: 27 U/L (ref 9–39)
BILIRUB SERPL-MCNC: 0.6 MG/DL (ref 0–1.2)
BUN SERPL-MCNC: 9 MG/DL (ref 6–23)
CALCIUM SERPL-MCNC: 9.1 MG/DL (ref 8.6–10.6)
CHLORIDE SERPL-SCNC: 104 MMOL/L (ref 98–107)
CHOLEST SERPL-MCNC: 165 MG/DL (ref 0–199)
CHOLESTEROL/HDL RATIO: 3.2
CO2 SERPL-SCNC: 25 MMOL/L (ref 21–32)
CREAT SERPL-MCNC: 1.14 MG/DL (ref 0.5–1.05)
EGFRCR SERPLBLD CKD-EPI 2021: 60 ML/MIN/1.73M*2
GLUCOSE SERPL-MCNC: 81 MG/DL (ref 74–99)
HDLC SERPL-MCNC: 52.3 MG/DL
LDLC SERPL CALC-MCNC: 89 MG/DL
NON HDL CHOLESTEROL: 113 MG/DL (ref 0–149)
POTASSIUM SERPL-SCNC: 4.3 MMOL/L (ref 3.5–5.3)
PROT SERPL-MCNC: 7.4 G/DL (ref 6.4–8.2)
SODIUM SERPL-SCNC: 140 MMOL/L (ref 136–145)
T4 FREE SERPL-MCNC: 1.39 NG/DL (ref 0.78–1.48)
TRIGL SERPL-MCNC: 117 MG/DL (ref 0–149)
TSH SERPL-ACNC: 3.16 MIU/L (ref 0.44–3.98)
VLDL: 23 MG/DL (ref 0–40)

## 2024-10-09 PROCEDURE — 36415 COLL VENOUS BLD VENIPUNCTURE: CPT

## 2024-10-09 PROCEDURE — 84439 ASSAY OF FREE THYROXINE: CPT

## 2024-10-09 PROCEDURE — 80061 LIPID PANEL: CPT

## 2024-10-09 PROCEDURE — 84443 ASSAY THYROID STIM HORMONE: CPT

## 2024-10-09 PROCEDURE — 80053 COMPREHEN METABOLIC PANEL: CPT

## 2024-10-10 ENCOUNTER — TELEPHONE (OUTPATIENT)
Dept: PRIMARY CARE | Facility: CLINIC | Age: 44
End: 2024-10-10
Payer: COMMERCIAL

## 2024-10-10 DIAGNOSIS — R79.89 ELEVATED SERUM CREATININE: Primary | ICD-10-CM

## 2024-10-23 ENCOUNTER — APPOINTMENT (OUTPATIENT)
Dept: PRIMARY CARE | Facility: CLINIC | Age: 44
End: 2024-10-23
Payer: COMMERCIAL

## 2024-10-23 ENCOUNTER — LAB (OUTPATIENT)
Dept: LAB | Facility: LAB | Age: 44
End: 2024-10-23
Payer: COMMERCIAL

## 2024-10-23 VITALS
HEART RATE: 72 BPM | TEMPERATURE: 98.4 F | DIASTOLIC BLOOD PRESSURE: 64 MMHG | HEIGHT: 68 IN | OXYGEN SATURATION: 99 % | SYSTOLIC BLOOD PRESSURE: 104 MMHG | BODY MASS INDEX: 22.28 KG/M2 | RESPIRATION RATE: 16 BRPM | WEIGHT: 147 LBS

## 2024-10-23 DIAGNOSIS — R79.89 ELEVATED SERUM CREATININE: ICD-10-CM

## 2024-10-23 DIAGNOSIS — G47.00 INSOMNIA, UNSPECIFIED TYPE: ICD-10-CM

## 2024-10-23 DIAGNOSIS — R79.89 ELEVATED SERUM CREATININE: Primary | ICD-10-CM

## 2024-10-23 DIAGNOSIS — E03.9 HYPOTHYROIDISM, UNSPECIFIED TYPE: ICD-10-CM

## 2024-10-23 DIAGNOSIS — N92.6 IRREGULAR MENSES: ICD-10-CM

## 2024-10-23 DIAGNOSIS — E78.5 HYPERLIPIDEMIA, UNSPECIFIED HYPERLIPIDEMIA TYPE: ICD-10-CM

## 2024-10-23 LAB
ANION GAP SERPL CALC-SCNC: 14 MMOL/L (ref 10–20)
BUN SERPL-MCNC: 16 MG/DL (ref 6–23)
CALCIUM SERPL-MCNC: 9.4 MG/DL (ref 8.6–10.6)
CHLORIDE SERPL-SCNC: 102 MMOL/L (ref 98–107)
CO2 SERPL-SCNC: 27 MMOL/L (ref 21–32)
CREAT SERPL-MCNC: 0.98 MG/DL (ref 0.5–1.05)
EGFRCR SERPLBLD CKD-EPI 2021: 72 ML/MIN/1.73M*2
GLUCOSE SERPL-MCNC: 82 MG/DL (ref 74–99)
POTASSIUM SERPL-SCNC: 4.3 MMOL/L (ref 3.5–5.3)
SODIUM SERPL-SCNC: 139 MMOL/L (ref 136–145)

## 2024-10-23 PROCEDURE — 99214 OFFICE O/P EST MOD 30 MIN: CPT | Performed by: FAMILY MEDICINE

## 2024-10-23 PROCEDURE — 36415 COLL VENOUS BLD VENIPUNCTURE: CPT

## 2024-10-23 PROCEDURE — 90471 IMMUNIZATION ADMIN: CPT | Performed by: FAMILY MEDICINE

## 2024-10-23 PROCEDURE — 80048 BASIC METABOLIC PNL TOTAL CA: CPT

## 2024-10-23 PROCEDURE — 3008F BODY MASS INDEX DOCD: CPT | Performed by: FAMILY MEDICINE

## 2024-10-23 PROCEDURE — 90656 IIV3 VACC NO PRSV 0.5 ML IM: CPT | Performed by: FAMILY MEDICINE

## 2024-10-23 RX ORDER — LEVOTHYROXINE SODIUM 100 UG/1
100 TABLET ORAL DAILY
Qty: 90 TABLET | Refills: 1 | Status: SHIPPED | OUTPATIENT
Start: 2024-10-23 | End: 2025-10-23

## 2024-10-23 RX ORDER — DROSPIRENONE AND ETHINYL ESTRADIOL 0.02-3(28)
1 KIT ORAL DAILY
Qty: 84 TABLET | Refills: 1 | Status: SHIPPED | OUTPATIENT
Start: 2024-10-23 | End: 2025-10-23

## 2024-10-23 RX ORDER — ATORVASTATIN CALCIUM 10 MG/1
5 TABLET, FILM COATED ORAL NIGHTLY
Qty: 45 TABLET | Refills: 1 | Status: SHIPPED | OUTPATIENT
Start: 2024-10-23 | End: 2025-10-23

## 2024-10-23 RX ORDER — TRAZODONE HYDROCHLORIDE 50 MG/1
75 TABLET ORAL NIGHTLY
Qty: 135 TABLET | Refills: 1 | Status: SHIPPED | OUTPATIENT
Start: 2024-10-23 | End: 2025-10-23

## 2024-10-23 NOTE — PROGRESS NOTES
"Current Outpatient Medications   Medication Sig Dispense Refill    atorvastatin (Lipitor) 10 mg tablet Take 0.5 tablets (5 mg) by mouth once daily at bedtime. 45 tablet 1    fluticasone (Flonase) 50 mcg/actuation nasal spray Administer 1-2 sprays into each nostril once daily.      hydrocortisone (Anusol-HC) 2.5 % rectal cream Insert into the rectum 4 times a day as needed for hemorrhoids (rectal discomfort). Apply to affected areas 30 g 0    levothyroxine (Synthroid, Levoxyl) 100 mcg tablet Take 1 tablet (100 mcg) by mouth once daily. 90 tablet 1    loratadine (Claritin) 10 mg tablet Take 1 tablet (10 mg) by mouth once daily.      multivitamin tablet Take 1 tablet by mouth once daily.      norethindrone-e.estradioL-iron (Microgestin FE 1.5/30) 1.5 mg-30 mcg (21)/75 mg (7) tablet Take 1 tablet by mouth once daily. 84 tablet 3    traZODone (Desyrel) 50 mg tablet TAKE 1 AND 1/2 TABLETS (75 MG) BY MOUTH AS NEEDED AT BEDTIME FOR SLEEP. 135 tablet 0     No current facility-administered medications for this visit.   Subjective   Patient ID: Arpita Dalton is a 44 y.o. female who presents for Hypothyroidism, Hyperlipidemia, and Contraception (Discuss switching medication).    She is taking half of a lipitor and its doing great.    She is also havign to get her bmp rechecked    She doesn't like how her certain ocp that she is on makes her feel.  The period is longer with more spotting    Hyperlipidemia         Review of Systems    Objective   /64   Pulse 72   Temp 36.9 °C (98.4 °F)   Resp 16   Ht 1.727 m (5' 8\")   Wt 66.7 kg (147 lb)   SpO2 99%   BMI 22.35 kg/m²     Physical Exam  Constitutional:       Appearance: Normal appearance.   HENT:      Head: Normocephalic and atraumatic.      Right Ear: Tympanic membrane normal.      Left Ear: Tympanic membrane normal.      Nose: No congestion.   Eyes:      Pupils: Pupils are equal, round, and reactive to light.   Neck:      Thyroid: No thyroid mass, thyromegaly or " thyroid tenderness.   Cardiovascular:      Rate and Rhythm: Normal rate and regular rhythm.   Pulmonary:      Effort: Pulmonary effort is normal.      Breath sounds: Normal breath sounds.   Musculoskeletal:      Cervical back: Normal range of motion and neck supple.   Skin:     General: Skin is warm and dry.      Capillary Refill: Capillary refill takes less than 2 seconds.   Neurological:      Mental Status: She is alert and oriented to person, place, and time.   Psychiatric:         Mood and Affect: Mood normal.         Behavior: Behavior normal.         Assessment/Plan   Diagnoses and all orders for this visit:  Elevated serum creatinine  Hyperlipidemia, unspecified hyperlipidemia type  -     atorvastatin (Lipitor) 10 mg tablet; Take 0.5 tablets (5 mg) by mouth once daily at bedtime.  Irregular menses  -     drospirenone-ethinyl estradioL (Tiffanie, 28,) 3-0.02 mg tablet; Take 1 tablet by mouth once daily.  Insomnia, unspecified type  -     traZODone (Desyrel) 50 mg tablet; Take 1.5 tablets (75 mg) by mouth once daily at bedtime.  Hypothyroidism, unspecified type  -     levothyroxine (Synthroid, Levoxyl) 100 mcg tablet; Take 1 tablet (100 mcg) by mouth once daily.  Other orders  -     Flu vaccine, trivalent, preservative free, age 6 months and greater (Fluarix/Fluzone/Flulaval)

## 2024-10-23 NOTE — PATIENT INSTRUCTIONS
Today we have followed up on multiple items    For your insomnia and thyroid these are stable and I have refilled medications and reviewed your labs that were done    You had an elevated creatinine so you will get your BMP repeated.      You were given a seasonal Flu vaccine today      For your ocps you were unhappy with the recent pills so we will switch you back to the Tiffanie that you did really well on before.      You had your lipids rechecked and they were fantastic!  You have been tolerating your statin and doing well with diet.  Continue healthy diet and exercise habits.     Please make an appointment to follow up for your Annual Physical.

## 2024-12-09 ENCOUNTER — PATIENT MESSAGE (OUTPATIENT)
Facility: CLINIC | Age: 44
End: 2024-12-09
Payer: COMMERCIAL

## 2024-12-09 DIAGNOSIS — G47.00 INSOMNIA, UNSPECIFIED TYPE: ICD-10-CM

## 2024-12-09 RX ORDER — TRAZODONE HYDROCHLORIDE 50 MG/1
75 TABLET ORAL NIGHTLY
Qty: 45 TABLET | Refills: 0 | Status: SHIPPED | OUTPATIENT
Start: 2024-12-09 | End: 2025-01-08

## 2025-01-29 ENCOUNTER — OFFICE VISIT (OUTPATIENT)
Dept: ORTHOPEDIC SURGERY | Facility: CLINIC | Age: 45
End: 2025-01-29
Payer: COMMERCIAL

## 2025-01-29 ENCOUNTER — HOSPITAL ENCOUNTER (OUTPATIENT)
Dept: RADIOLOGY | Facility: CLINIC | Age: 45
Discharge: HOME | End: 2025-01-29
Payer: COMMERCIAL

## 2025-01-29 DIAGNOSIS — M25.561 RIGHT MEDIAL KNEE PAIN: Primary | ICD-10-CM

## 2025-01-29 DIAGNOSIS — M25.561 ACUTE PAIN OF RIGHT KNEE: ICD-10-CM

## 2025-01-29 DIAGNOSIS — M22.41 CHONDROMALACIA OF RIGHT PATELLA: ICD-10-CM

## 2025-01-29 PROCEDURE — 99214 OFFICE O/P EST MOD 30 MIN: CPT | Performed by: PEDIATRICS

## 2025-01-29 PROCEDURE — 73564 X-RAY EXAM KNEE 4 OR MORE: CPT | Mod: RT

## 2025-01-29 RX ORDER — MELOXICAM 15 MG/1
15 TABLET ORAL DAILY
Qty: 30 TABLET | Refills: 2 | Status: SHIPPED | OUTPATIENT
Start: 2025-01-29

## 2025-01-29 NOTE — PROGRESS NOTES
A report with my findings and recommendations will be sent to the Kayy Self DO via written or electronic means when information is available    History of Present Illness:  Kaylen Dalton is a 44 y.o. female here for recurrence of right medial knee pain since November without know reason.  I last saw her in march 2024 for similar pain. She did PT and felt much improved. She has been able to run 2x/ week, swim, yoga, and does HEP regularly.   No known injury  No catch, Lock, or give way  No swelling  No redness or warmth  Increased pain with deep knee flexion-- unable to do side lunge in yoga-- sharp pressure   She has not been running recently due to ice so not sure if she could run.    Prior Treatment:   Physical Therapy  Yes - completed and does hep  Medications No  Modified activities/sports Yes - limited in yoga and breast stroke swimming due to knee pain    LOV 3/2024 for acute knee pain.   March 2024:   MRI IMPRESSION:  1. No internal derangement of the right knee.  2. Minimal patellofemoral osteoarthrosis with focal fissuring of the  medial patellar facet.  3. Small suprapatellar knee joint effusion.  PLAN/FOLLOW UP:    Trial debo-pull lite brace  Alleve 2 tabs twice daily x 2 weeks  Ice after activity  F/up 4-6 weeks if not imprving     Past MSK HX:  Specialty Problems    None    Medications:   Current Outpatient Medications on File Prior to Visit   Medication Sig Dispense Refill    atorvastatin (Lipitor) 10 mg tablet Take 0.5 tablets (5 mg) by mouth once daily at bedtime. 45 tablet 1    drospirenone-ethinyl estradioL (Tiffanie, 28,) 3-0.02 mg tablet Take 1 tablet by mouth once daily. 84 tablet 1    fluticasone (Flonase) 50 mcg/actuation nasal spray Administer 1-2 sprays into each nostril once daily.      hydrocortisone (Anusol-HC) 2.5 % rectal cream Insert into the rectum 4 times a day as needed for hemorrhoids (rectal discomfort). Apply to affected areas 30 g 0    levothyroxine (Synthroid, Levoxyl)  100 mcg tablet Take 1 tablet (100 mcg) by mouth once daily. 90 tablet 1    loratadine (Claritin) 10 mg tablet Take 1 tablet (10 mg) by mouth once daily.      multivitamin tablet Take 1 tablet by mouth once daily.      traZODone (Desyrel) 50 mg tablet Take 1.5 tablets (75 mg) by mouth once daily at bedtime. 45 tablet 0     No current facility-administered medications on file prior to visit.         Allergies:    Allergies   Allergen Reactions    Erythromycin Nausea Only        Physical Exam:  General appearance: Well-appearing well-nourished  Psych: Normal mood and affect  KNEE EXAM:  INSPECTION:  no soft tissue swelling, redness, or warmth  no skin changes  no deformities    FUNCTIONAL:  Gait normal without limp    MOTION:  log roll: no hip pain with Full PROM KASHIF  HIP Flex to 90/knee to 90: no hip pain with Full PROM KASHIF  SLR: no low back pain or radicular pain KASHIF  Hip flexion: 5/5 strength KASHIF without pain    Knee: Full PROM without PAIN KASHIF-- pressure medial joint line end flex  Knee ext: 5/5 KASHIF  Knee Flexion: 5/5 KASHIF    PALPATION:  Effusion: none  Peripatellar: medial TTP, Neg Grind, normal glide, neg tilt, neg apprehension  Joint line: medial TTP (not MCL)  Tibial tubercle: No TTP  Hoffa's fat pad No TTP  Quad tendon: WNL, NTTP  Patella tendon: WNL, NTTP  MCL: No TTP, No pain with valgus stress, NO opening at 0d (deep), 30d (superficial)  LCL: No TTP, No Pain, NO varus opening at 30 (LCL), No varus opening at 0 & 30d (combined)  Anterior Drawer: equal excursion kashif with endpoint --> NEG  Posterior Drawer: no sag and good end point--> NEG  Lachmans: equal excursion kashif with endpoint --> NEG  McMurrays: no joint line pain or catch-->pain loading medial joint line but no catch  Bounce: NEG    Imaging: Radiology images of the area of concern were ordered and independently viewed and interpreted in the presence of the patient's family.    Impression and Plan:  Kaylen Dalton is a 44 y.o. female here for f/up  of   1. Right medial knee pain        2. Chondromalacia of right patella  XR knee right 4+ views    meloxicam (Mobic) 15 mg tablet         Trial Mobic  Gave CORE exercises to try to strengthen hips and improve chronic hamstring pain  Limit painful activities  If not improving, consider repeat Mri given clinical concern for meniscal instability but also re-evaluate hamstring as that may be keeping her from healing    PLAN:   Diagnosis, evaluation, and treatment options were explained to patient in detail and questions answered.   See patient instructions for information provided to patient on diagnosis, evaluation, and treatment.   Further treatment as discussed.    FOLLOW UP:  prn   Call Pediatric Sports Medicine Office [phone] if not improving as expected or any further concern.      ** Please excuse any errors in grammar or translation related to this dictation. Voice recognition software was utilized to prepare this document. **

## 2025-01-29 NOTE — PATIENT INSTRUCTIONS
Home Exercises to Start:  GLUTEAL AND HIP ACTIVATION PREHAB EXERCISES   Reprinted from Matias Reyes, CPT, VAN, CSCS  123 4    1. Tabletop Heel Lift  Start in a tabletop or quadruped position with the wrist aligned directly under the shoulder joints and the kneecaps directly beneath the hip sockets. Pull the belly button into the spine to engage the abdominals and hold the face parallel to the floor in order to create a neutral and stable spine. Next, drive one heel up into the lisa as high as possible while keeping the knee bent at 90 degrees and maintaining a neutral spine. Do not allow your lower back to arch. Continue to pull the belly button into the spine s you press the heel into the lisa.  Perform 5-10 reps on each leg.    2. Hip Hike  Lie on your side with the bottom elbow, hip and anklebone all aligned in a straight line on the floor. Make sure the elbow is directly beneath the shoulder joint. Next, press the hips up into the lisa until the spine and legs are aligned in a straight line. Then lower down slowly and repeat several more times. This exercise will help activate the Gluteus Medius, which is located on the lateral side of the hip.  Perform 5-10 reps on each side.    3. Side Plank (+/- Hip Abduction)  Lie on your side with the bottom elbow, hip and anklebone all aligned in a straight line on the floor. Make sure the elbow is directly beneath the shoulder joint. Next, press the hips up into the lisa until the spine and legs are aligned in a straight line. Once you can do this well and hold for 30 seconds, then add the leg abduction.   Now, begin to lift and lower the top leg several times while maintaining a straight-line alignment with the bottom shoulder, hip and ankle. Do not let your hips drop towards the floor or hinge backwards. Maintain a neutral spine and move with control. This exercise will help activate the Gluteus Medius, which will serve to protect the ACL.  Perform 5-10 abductions with  each leg.      4. Bridge March  Lie on the floor with the knees bent at 90 degrees and the forefoot (toes) off the floor. Press the hips up into the air until they align with the knees and shoulders in a straight line. Next, begin to march the legs by reaching one knee up into the lisa at a time. Make sure that the hips remain level with the floor. Do not allow one hip to drop towards the floor while marching; this is a sign of instability in the hip socket. Also, keep a neutral spine and do not arch in the lower back.  Perform 10-15 marches with each leg.                 [id number]      5. Single-Leg Bridge  Lie on the floor with the knees bent at 90 degrees and the forefoot (toes) off the floor. Press the hips up into the air until they align with the knees and shoulders in a straight line. Next, pull on leg into the torso and hug the kneecap tight into the heart. Then press into the floor with the opposite heel and drive the hips up into the air as high as possible. Lower down slowly and repeat several more times. Make sure that the lower back does not arch or over-extend and squeeze the leg into the chest as much as possible as this will lock the pelvis from 'rolling' when bridging. This exercise will help activate the Gluteus Rubén and Minimus as well as the Piriformis.  Perform 5-10 bridges with each leg.    6. Clams  Lie on your side with a small resistance band placed around your thighs or shine, as close to the knees as possible. For best results, position your body up against a wall with both the hips and heels touching the wall. Next, pull the knees apart from one another while keeping the heels touching and maintaining a neutral spine. Open the knees as far as possible on each and every rep and close them in a slow and controlled manner. This exercise will help to activate the Gluteus Medius and protect the ACL.  Perform 5-10 reps on each side.    7. Air Squat with Bands  Stand with the feet shoulder width  apart and wrap a small resistance band around the legs as close as possible to the knees. Next, squat the hips down to the floor as low as possible and then return to standing. Repeat this movement several times and on each rep, actively press the knee out wide against the resistance band in order to help activate more muscles in the hips. Perform 10-15 reps      8. Band Walks: Forward & Lateral   an athletic position with the feet shoulder width apart and wrap a small resistance band around the legs as close as possible to the knees. Next, walk forward while keeping the feet at shoulder width apart. Then, return to the same spot be walking backwards and keeping the feet at shoulder width apart. Next, walk to the side for several steps. Step out as far as possible on each step and then return to the same starting position. These exercises will help activate the Gluteus Medius and protect the ACL from injury. Walk 10-20 steps in each direction.

## 2025-01-31 ENCOUNTER — APPOINTMENT (OUTPATIENT)
Dept: PRIMARY CARE | Facility: CLINIC | Age: 45
End: 2025-01-31
Payer: COMMERCIAL

## 2025-01-31 VITALS
BODY MASS INDEX: 22.28 KG/M2 | RESPIRATION RATE: 16 BRPM | HEART RATE: 65 BPM | SYSTOLIC BLOOD PRESSURE: 104 MMHG | HEIGHT: 68 IN | TEMPERATURE: 98.4 F | WEIGHT: 147 LBS | OXYGEN SATURATION: 98 % | DIASTOLIC BLOOD PRESSURE: 66 MMHG

## 2025-01-31 DIAGNOSIS — Z12.4 CERVICAL CANCER SCREENING: ICD-10-CM

## 2025-01-31 DIAGNOSIS — E04.1 THYROID NODULE: ICD-10-CM

## 2025-01-31 DIAGNOSIS — G47.00 INSOMNIA, UNSPECIFIED TYPE: ICD-10-CM

## 2025-01-31 DIAGNOSIS — Z12.11 SCREENING FOR MALIGNANT NEOPLASM OF COLON: ICD-10-CM

## 2025-01-31 DIAGNOSIS — N92.6 IRREGULAR MENSES: ICD-10-CM

## 2025-01-31 DIAGNOSIS — E78.5 HYPERLIPIDEMIA, UNSPECIFIED HYPERLIPIDEMIA TYPE: ICD-10-CM

## 2025-01-31 DIAGNOSIS — Z00.00 HEALTHCARE MAINTENANCE: Primary | ICD-10-CM

## 2025-01-31 DIAGNOSIS — Z12.31 BREAST CANCER SCREENING BY MAMMOGRAM: ICD-10-CM

## 2025-01-31 DIAGNOSIS — E06.3 HASHIMOTO'S THYROIDITIS: ICD-10-CM

## 2025-01-31 PROBLEM — R93.1 AGATSTON CAC SCORE, <100: Status: ACTIVE | Noted: 2025-01-31

## 2025-01-31 PROBLEM — R79.89 HIGH THYROID STIMULATING HORMONE (TSH) LEVEL: Status: RESOLVED | Noted: 2023-03-13 | Resolved: 2025-01-31

## 2025-01-31 LAB
POC APPEARANCE, URINE: CLEAR
POC BILIRUBIN, URINE: NEGATIVE
POC BLOOD, URINE: NEGATIVE
POC COLOR, URINE: YELLOW
POC GLUCOSE, URINE: NEGATIVE MG/DL
POC KETONES, URINE: NEGATIVE MG/DL
POC LEUKOCYTES, URINE: NEGATIVE
POC NITRITE,URINE: NEGATIVE
POC PH, URINE: 5.5 PH
POC PROTEIN, URINE: NEGATIVE MG/DL
POC SPECIFIC GRAVITY, URINE: <=1.005
POC UROBILINOGEN, URINE: 0.2 EU/DL

## 2025-01-31 PROCEDURE — 3008F BODY MASS INDEX DOCD: CPT | Performed by: FAMILY MEDICINE

## 2025-01-31 PROCEDURE — 1036F TOBACCO NON-USER: CPT | Performed by: FAMILY MEDICINE

## 2025-01-31 PROCEDURE — 81002 URINALYSIS NONAUTO W/O SCOPE: CPT | Performed by: FAMILY MEDICINE

## 2025-01-31 PROCEDURE — 99396 PREV VISIT EST AGE 40-64: CPT | Performed by: FAMILY MEDICINE

## 2025-01-31 PROCEDURE — 93000 ELECTROCARDIOGRAM COMPLETE: CPT | Performed by: FAMILY MEDICINE

## 2025-01-31 RX ORDER — DROSPIRENONE AND ETHINYL ESTRADIOL 0.02-3(28)
1 KIT ORAL DAILY
Qty: 84 TABLET | Refills: 3 | Status: SHIPPED | OUTPATIENT
Start: 2025-01-31 | End: 2026-01-31

## 2025-01-31 RX ORDER — TRAZODONE HYDROCHLORIDE 50 MG/1
75 TABLET ORAL NIGHTLY
Qty: 135 TABLET | Refills: 1 | Status: SHIPPED | OUTPATIENT
Start: 2025-01-31 | End: 2026-01-31

## 2025-01-31 ASSESSMENT — PATIENT HEALTH QUESTIONNAIRE - PHQ9
1. LITTLE INTEREST OR PLEASURE IN DOING THINGS: NOT AT ALL
SUM OF ALL RESPONSES TO PHQ9 QUESTIONS 1 AND 2: 0
2. FEELING DOWN, DEPRESSED OR HOPELESS: NOT AT ALL

## 2025-01-31 NOTE — PATIENT INSTRUCTIONS
Today we performed your Annual Physical Exam.  Your preventative health care, labs and vaccinations have been reviewed and are up to date.      Your vaccines are up to date.     You are due to have your lipids rechecked today. You have been tolerating your statin and doing well with diet.  Continue healthy diet and exercise habits. Labs are ordered.      Today we followed up on your Thyroid medication - we will check your labs and adjust the medication accordingly.  Our goal will be to have a TSH between 2 and 3.  We will closely monitor your symptoms to determine the optimal dosing.  I also reordered your thyroid ultrasound since you have a nodule and it's been a couple of years since you had your thyroid checked.      Follow up in 6 months for a medication check

## 2025-01-31 NOTE — PROGRESS NOTES
"Current Outpatient Medications   Medication Sig Dispense Refill    atorvastatin (Lipitor) 10 mg tablet Take 0.5 tablets (5 mg) by mouth once daily at bedtime. 45 tablet 1    fluticasone (Flonase) 50 mcg/actuation nasal spray Administer 1-2 sprays into each nostril once daily.      hydrocortisone (Anusol-HC) 2.5 % rectal cream Insert into the rectum 4 times a day as needed for hemorrhoids (rectal discomfort). Apply to affected areas 30 g 0    levothyroxine (Synthroid, Levoxyl) 100 mcg tablet Take 1 tablet (100 mcg) by mouth once daily. 90 tablet 1    loratadine (Claritin) 10 mg tablet Take 1 tablet (10 mg) by mouth once daily.      meloxicam (Mobic) 15 mg tablet Take 1 tablet (15 mg) by mouth once daily. 30 tablet 2    multivitamin tablet Take 1 tablet by mouth once daily.      drospirenone-ethinyl estradiol (Tiffanie, 28,) 3-0.02 mg tablet Take 1 tablet by mouth once daily. 84 tablet 3    traZODone (Desyrel) 50 mg tablet Take 1.5 tablets (75 mg) by mouth once daily at bedtime. 135 tablet 1     No current facility-administered medications for this visit.   Subjective   Patient ID: Arpita Dalton is a 44 y.o. female who presents for Annual Exam.    Dentist and Eye Doctor appointments: UTD on dentist and going to eye doctor in March   Immunizations: UTD  Diet: tries to eat healthy but some higher fat dairy  Exercise: yoga, walking, swimming, jogging  Supplements: mvi  Menstrual Cycles:short  Sexually Active:yes, no std concerns  Pregnancy History: G0  Cancer Screening:    Cervical: 2024   Breast: due   Colon: due in 2031   Skin:sees derm   DEXA:n/a    HPI     Review of Systems    Objective   /66   Pulse 65   Temp 36.9 °C (98.4 °F)   Resp 16   Ht 1.727 m (5' 8\")   Wt 66.7 kg (147 lb)   LMP 01/13/2025 Comment: last pap 1/2024 PCP  SpO2 98%   BMI 22.35 kg/m²     Physical Exam  Constitutional:       General: She is not in acute distress.     Appearance: She is well-developed. She is not diaphoretic.   HENT:      " Head: Normocephalic and atraumatic.      Right Ear: Tympanic membrane normal.      Left Ear: Tympanic membrane normal.      Nose: Nose normal.      Mouth/Throat:      Mouth: Mucous membranes are moist.   Eyes:      General: No scleral icterus.     Pupils: Pupils are equal, round, and reactive to light.   Neck:      Thyroid: No thyromegaly.      Vascular: No JVD.   Cardiovascular:      Rate and Rhythm: Normal rate and regular rhythm.      Heart sounds: Normal heart sounds. No murmur heard.     No friction rub. No gallop.   Pulmonary:      Effort: Pulmonary effort is normal. No respiratory distress.      Breath sounds: Normal breath sounds. No wheezing or rales.   Chest:      Chest wall: No tenderness.   Breasts:     Right: Normal.      Left: Normal.   Abdominal:      General: Bowel sounds are normal. There is no distension.      Palpations: Abdomen is soft. There is no mass.      Tenderness: There is no abdominal tenderness. There is no rebound.   Musculoskeletal:         General: Normal range of motion.      Cervical back: Normal range of motion and neck supple.   Lymphadenopathy:      Cervical: No cervical adenopathy.   Skin:     General: Skin is warm and dry.   Neurological:      General: No focal deficit present.      Mental Status: She is alert and oriented to person, place, and time.      Deep Tendon Reflexes: Reflexes normal.   Psychiatric:         Mood and Affect: Mood normal.         Behavior: Behavior normal.         Assessment/Plan   Diagnoses and all orders for this visit:  Healthcare maintenance  -     CBC; Future  -     Comprehensive Metabolic Panel; Future  -     Vitamin D 25 hydroxy; Future  -     Lipid Panel; Future  -     ECG 12 Lead  Hashimoto's thyroiditis  -     Thyroxine, Free; Future  -     Thyroid Stimulating Hormone; Future  Hyperlipidemia, unspecified hyperlipidemia type  Irregular menses  -     drospirenone-ethinyl estradiol (Tiffanie, Kristie,) 3-0.02 mg tablet; Take 1 tablet by mouth once  daily.  Breast cancer screening by mammogram  -     BI mammo bilateral screening tomosynthesis; Future  Cervical cancer screening  Comments:  2024- wnl negative hpv no history of abnormal  Screening for malignant neoplasm of colon  Comments:  2021 - due in 10 years  Thyroid nodule  -     US thyroid; Future  Insomnia, unspecified type  -     traZODone (Desyrel) 50 mg tablet; Take 1.5 tablets (75 mg) by mouth once daily at bedtime.

## 2025-02-18 DIAGNOSIS — M22.2X1 PATELLOFEMORAL PAIN SYNDROME OF RIGHT KNEE: Primary | ICD-10-CM

## 2025-02-18 NOTE — PROGRESS NOTES
Pt with ongoing knee pain x 4 months. She has completed PT, taken NSAIDS (MOBIC) x 6+ weeks, has modified activity, and tried  brace.  Exam concerning for meniscal tear given pain deep flexion, limited weightbearing activity if requires bending knee.   MRI ordered.

## 2025-02-28 ENCOUNTER — HOSPITAL ENCOUNTER (OUTPATIENT)
Dept: RADIOLOGY | Facility: HOSPITAL | Age: 45
Discharge: HOME | End: 2025-02-28
Payer: COMMERCIAL

## 2025-02-28 DIAGNOSIS — E04.1 THYROID NODULE: ICD-10-CM

## 2025-02-28 PROCEDURE — 76536 US EXAM OF HEAD AND NECK: CPT

## 2025-03-01 LAB
25(OH)D3+25(OH)D2 SERPL-MCNC: 47 NG/ML (ref 30–100)
ALBUMIN SERPL-MCNC: 4.4 G/DL (ref 3.6–5.1)
ALP SERPL-CCNC: 30 U/L (ref 31–125)
ALT SERPL-CCNC: 14 U/L (ref 6–29)
ANION GAP SERPL CALCULATED.4IONS-SCNC: 8 MMOL/L (CALC) (ref 7–17)
AST SERPL-CCNC: 24 U/L (ref 10–35)
BILIRUB SERPL-MCNC: 0.6 MG/DL (ref 0.2–1.2)
BUN SERPL-MCNC: 14 MG/DL (ref 7–25)
CALCIUM SERPL-MCNC: 9.4 MG/DL (ref 8.6–10.2)
CHLORIDE SERPL-SCNC: 106 MMOL/L (ref 98–110)
CHOLEST SERPL-MCNC: 213 MG/DL
CHOLEST/HDLC SERPL: 2.5 (CALC)
CO2 SERPL-SCNC: 25 MMOL/L (ref 20–32)
CREAT SERPL-MCNC: 0.96 MG/DL (ref 0.5–0.99)
EGFRCR SERPLBLD CKD-EPI 2021: 74 ML/MIN/1.73M2
ERYTHROCYTE [DISTWIDTH] IN BLOOD BY AUTOMATED COUNT: 12.6 % (ref 11–15)
GLUCOSE SERPL-MCNC: 78 MG/DL (ref 65–99)
HCT VFR BLD AUTO: 44.6 % (ref 35–45)
HDLC SERPL-MCNC: 85 MG/DL
HGB BLD-MCNC: 14.5 G/DL (ref 11.7–15.5)
LDLC SERPL CALC-MCNC: 105 MG/DL (CALC)
MCH RBC QN AUTO: 30.3 PG (ref 27–33)
MCHC RBC AUTO-ENTMCNC: 32.5 G/DL (ref 32–36)
MCV RBC AUTO: 93.1 FL (ref 80–100)
NONHDLC SERPL-MCNC: 128 MG/DL (CALC)
PLATELET # BLD AUTO: 232 THOUSAND/UL (ref 140–400)
PMV BLD REES-ECKER: 11.6 FL (ref 7.5–12.5)
POTASSIUM SERPL-SCNC: 4.6 MMOL/L (ref 3.5–5.3)
PROT SERPL-MCNC: 7.8 G/DL (ref 6.1–8.1)
RBC # BLD AUTO: 4.79 MILLION/UL (ref 3.8–5.1)
SODIUM SERPL-SCNC: 139 MMOL/L (ref 135–146)
T4 FREE SERPL-MCNC: 1.3 NG/DL (ref 0.8–1.8)
TRIGL SERPL-MCNC: 124 MG/DL
TSH SERPL-ACNC: 3.04 MIU/L
WBC # BLD AUTO: 4.6 THOUSAND/UL (ref 3.8–10.8)

## 2025-03-04 ENCOUNTER — APPOINTMENT (OUTPATIENT)
Dept: RADIOLOGY | Facility: CLINIC | Age: 45
End: 2025-03-04
Payer: COMMERCIAL

## 2025-03-06 ENCOUNTER — HOSPITAL ENCOUNTER (OUTPATIENT)
Dept: RADIOLOGY | Facility: CLINIC | Age: 45
Discharge: HOME | End: 2025-03-06
Payer: COMMERCIAL

## 2025-03-06 ENCOUNTER — APPOINTMENT (OUTPATIENT)
Dept: RADIOLOGY | Facility: HOSPITAL | Age: 45
End: 2025-03-06
Payer: COMMERCIAL

## 2025-03-06 DIAGNOSIS — M22.2X1 PATELLOFEMORAL PAIN SYNDROME OF RIGHT KNEE: ICD-10-CM

## 2025-03-06 PROCEDURE — 73721 MRI JNT OF LWR EXTRE W/O DYE: CPT | Mod: RT

## 2025-03-12 ENCOUNTER — APPOINTMENT (OUTPATIENT)
Dept: RADIOLOGY | Facility: CLINIC | Age: 45
End: 2025-03-12
Payer: COMMERCIAL

## 2025-03-26 ENCOUNTER — OFFICE VISIT (OUTPATIENT)
Dept: ORTHOPEDIC SURGERY | Facility: CLINIC | Age: 45
End: 2025-03-26
Payer: COMMERCIAL

## 2025-03-26 DIAGNOSIS — S83.419A SPRAIN OF MEDIAL COLLATERAL LIGAMENT OF KNEE, INITIAL ENCOUNTER: Primary | ICD-10-CM

## 2025-03-26 DIAGNOSIS — M67.959 TENDINOPATHY OF GLUTEUS MEDIUS: ICD-10-CM

## 2025-03-26 DIAGNOSIS — M25.561 RIGHT MEDIAL KNEE PAIN: ICD-10-CM

## 2025-03-26 PROCEDURE — 20610 DRAIN/INJ JOINT/BURSA W/O US: CPT | Mod: RT | Performed by: PEDIATRICS

## 2025-03-26 PROCEDURE — 99215 OFFICE O/P EST HI 40 MIN: CPT | Performed by: PEDIATRICS

## 2025-03-26 PROCEDURE — 2500000004 HC RX 250 GENERAL PHARMACY W/ HCPCS (ALT 636 FOR OP/ED): Performed by: PEDIATRICS

## 2025-03-26 RX ORDER — TRIAMCINOLONE ACETONIDE 40 MG/ML
40 INJECTION, SUSPENSION INTRA-ARTICULAR; INTRAMUSCULAR
Status: COMPLETED | OUTPATIENT
Start: 2025-03-26 | End: 2025-03-26

## 2025-03-26 RX ADMIN — TRIAMCINOLONE ACETONIDE 40 MG: 40 INJECTION, SUSPENSION INTRA-ARTICULAR; INTRAMUSCULAR at 11:48

## 2025-03-26 NOTE — PROGRESS NOTES
A report with my findings and recommendations will be sent to the Kayy Self DO via written or electronic means when information is available    History of Present Illness:  Kaylen Dalton is a 44 y.o. female here for f/up of right knee pain for past year with some improvement.    3/26/2025 UPDATE: here for her mri follow up. Knee not improved. Last week, she tried to run in the nice weather but immediately felt pain in her right buttock/lat thigh. It felt weak.   Tried to run and felt right weakness. Had to stop running and walking back, she developed soreness in buttock/lateral thigh.   Pain is not new but much more significant.  She has had hip/hamstring pain for > 10 years but able to maintain well with nordic hamstring exercises as well as her core HEP from PT.   This buttock pain came on strong and has let up very little.    No known injury  No numb, ting, or radiation of pain-- pain is along her thigh but does not shoot  Denies imbalance or difficulty walking  No bowel/bladder changes  Denies waking at night due to pain  Denies constant and night time pain  Denies morning stiffness  Denies fever/chills    No history of injections  Did PT for hip and knee last formal was last year. Very good at doing HEP.  Tried Meloxicam x 2 weeks since last visit without improvement so stopped.  Her knee does not feel any better. Still hurts to squat, kneel, and in yoga.    Past [facility] HX:  Specialty Problems    None  Medications:   Current Outpatient Medications on File Prior to Visit   Medication Sig Dispense Refill    atorvastatin (Lipitor) 10 mg tablet Take 0.5 tablets (5 mg) by mouth once daily at bedtime. 45 tablet 1    drospirenone-ethinyl estradiol (Tiffanie, 28,) 3-0.02 mg tablet Take 1 tablet by mouth once daily. 84 tablet 3    fluticasone (Flonase) 50 mcg/actuation nasal spray Administer 1-2 sprays into each nostril once daily.      hydrocortisone (Anusol-HC) 2.5 % rectal cream Insert into the rectum 4 times  a day as needed for hemorrhoids (rectal discomfort). Apply to affected areas 30 g 0    levothyroxine (Synthroid, Levoxyl) 100 mcg tablet Take 1 tablet (100 mcg) by mouth once daily. 90 tablet 1    loratadine (Claritin) 10 mg tablet Take 1 tablet (10 mg) by mouth once daily.      meloxicam (Mobic) 15 mg tablet Take 1 tablet (15 mg) by mouth once daily. 30 tablet 2    multivitamin tablet Take 1 tablet by mouth once daily.      traZODone (Desyrel) 50 mg tablet Take 1.5 tablets (75 mg) by mouth once daily at bedtime. 135 tablet 1     No current facility-administered medications on file prior to visit.       Allergies:    Allergies   Allergen Reactions    Erythromycin Nausea Only        Physical Exam:  General appearance: Well-appearing well-nourished  Psych: Normal mood and affect  INSPECTION:  no soft tissue swelling, redness, or warmth  no skin changes  no deformities    Supine Exam:  Hip MOTION: all hip motion hurts her right buttock some  log roll: rt buttock but no ant hip pain with Full PROM KASHIF  HIP Flex to 90/knee to 90: rt buttock but no ant hip pain with Full PROM KASHIF  SLR: no low back pain or radicular pain KASHIF  EAGLE: negative for SI joint pain   EAGLE: distance off table: minimal. equal  FADIR: negative for anterior hip joint pain  Knee: Full PROM with medial joint  pain on end flexion    Hip flexion: 5/5 strength KASHIF without pain  Knee ext: 5/5 KASHIF  Knee Flexion: 5/5 KASHIF    PALPATION:  Effusion: none  Peripatellar: no TTP, Neg Grind, normal glide, neg tilt, neg apprehension  Joint line: medial TTP  Tibial tubercle: No TTP  Hoffa's fat pad No TTP  Quad tendon: WNL, NTTP  Patella tendon: WNL, NTTP  MCL: mild TTP, No pain with valgus stress, NO opening at 0d (deep), 30d (superficial)  Lachmans: equal excursion kashif with endpoint --> NEG  McMurrays: no joint line pain or catch--> PAINFUL MEDIAL JOINT without catch  Bounce: NEG    Palpation:   No TTP anterior hip joint line  No TTP: greater trochanter  No TTP:  flexor tendons  No TTP: tensor fascia eun (TFL)  No TTP: Iliotibial band (ITB)  No TTP: adductors  No TTP: quadriceps  TTP SI joint none  TTP Midline lumbar spine none  TTP Lumbar paraspinal muscles none  +TTP Glut Med  No TTP: ischial tuberosities  No TTP:  Hamstring  5/5 hip extension  5/5 Knee (hamstring) flexion at 30 & 90 degrees  Prone heel to butt: WNL KASHIF     Imaging: MR knee right wo IV contrast 03/06/2025    Narrative  Interpreted By:  Matias Colon,  STUDY:  MR KNEE RIGHT WO IV CONTRAST; ;  3/6/2025 3:50 pm    INDICATION:  Signs/Symptoms:painx 4 months, failed MOBIC, PT, rest/act mod-  concern fro meniscal tear.    COMPARISON:  03/15/2024    ACCESSION NUMBER(S):  [id number]    ORDERING CLINICIAN:  LAURA GOLDBERG    TECHNIQUE:  Multiplanar and multisequential MR images of the right knee are  performed.    FINDINGS:  There is a small joint effusion and Baker's cyst.    There is minimal thinning of the articular cartilage of the medial  compartment. There is no focal articular cartilage defect or loose  osteochondral lesion. There is some fissuring of the patellar  articular cartilage at the medial facet.    Small bone island is identified within the distal femoral metaphysis  laterally and femoral epiphysis medially as well as the anterior  patella. There is no bone marrow edema or additional osseous mass.    There is minimal edema and trace fluid superficial to the medial  collateral ligament and posteromedial joint capsule above and below  the joint line. There is no ligament discontinuity or laxity.    The anterior and posterior cruciate ligaments, lateral collateral  ligament complex, popliteus tendon, extensor complex, and patellar  retinacula are intact.    The lateral meniscus is of normal morphology. There is no linear tear  or truncation.    The medial meniscus is of normal morphology. There is mild edema at  the posterior meniscocapsular junction. There is no linear tear  truncation of  the meniscus.    There is mild intramuscular edema within the medial soleus and  gastrocnemius.    Impression  Early osteoarthritic changes in the medial compartment. There is some  fissuring of the patellar articular cartilage at the medial facet.    Mild grade 1 sprain of the medial collateral ligament.    No evidence of meniscal tear.        MACRO:  None    Signed by: Matias Colon 3/6/2025 3:56 PM  Dictation workstation:   [id number]    Impression:  Kaylen Dalton is a 44 y.o. female here for f/up of   1. Sprain of medial collateral ligament of knee, initial encounter    2. Right medial knee pain    3. Tendinopathy of gluteus medius         Plan:  Orders Placed This Encounter   Procedures    L Inj/Asp: R knee    Referral to Physical Therapy     L Inj/Asp: R knee on 3/26/2025 11:48 AM  Indications: pain and diagnostic evaluation  Details: 22 G needle, anterolateral approach  Medications: 40 mg triamcinolone acetonide 40 mg/mL  Outcome: tolerated well, no immediate complications  Procedure, treatment alternatives, risks and benefits explained, specific risks discussed. Consent was given by the patient. Immediately prior to procedure a time out was called to verify the correct patient, procedure, equipment, support staff and site/side marked as required. Patient was prepped and draped in the usual sterile fashion.         FOLLOW UP:  pending response to injection and PT -- if no relief with injection, we discussed surgical referral to consider knee scope  DIagnosis, evaluation, and treatment options were explained to patient in detail and questions answered.   See Patient Instructions for more details of what was provided to patient with further information on diagnosis, evaluation, and treatment.

## 2025-04-16 ENCOUNTER — EVALUATION (OUTPATIENT)
Dept: PHYSICAL THERAPY | Facility: CLINIC | Age: 45
End: 2025-04-16
Payer: COMMERCIAL

## 2025-04-16 DIAGNOSIS — S83.419A SPRAIN OF MEDIAL COLLATERAL LIGAMENT OF KNEE, INITIAL ENCOUNTER: ICD-10-CM

## 2025-04-16 DIAGNOSIS — M67.959 TENDINOPATHY OF GLUTEUS MEDIUS: ICD-10-CM

## 2025-04-16 DIAGNOSIS — M25.561 RIGHT MEDIAL KNEE PAIN: ICD-10-CM

## 2025-04-16 PROCEDURE — 97110 THERAPEUTIC EXERCISES: CPT | Mod: GP | Performed by: PHYSICAL THERAPIST

## 2025-04-16 PROCEDURE — 97161 PT EVAL LOW COMPLEX 20 MIN: CPT | Mod: GP | Performed by: PHYSICAL THERAPIST

## 2025-04-16 ASSESSMENT — PAIN SCALES - GENERAL
PAINLEVEL_OUTOF10: 2
PAINLEVEL_OUTOF10: 2

## 2025-04-16 ASSESSMENT — PAIN - FUNCTIONAL ASSESSMENT: PAIN_FUNCTIONAL_ASSESSMENT: 0-10

## 2025-04-16 ASSESSMENT — PAIN DESCRIPTION - DESCRIPTORS
DESCRIPTORS: SHARP
DESCRIPTORS: ACHING

## 2025-04-16 NOTE — PROGRESS NOTES
"Physical Therapy Evaluation and Treatment      Patient Name: Kaylen Dalton \"Arpita\"  MRN: [id number]  Today's Date: 4/16/2025    Time Entry:   Time Calculation  Start Time: 1410  Stop Time: 1452  Time Calculation (min): 42 min  PT Evaluation Time Entry  PT Evaluation (Low) Time Entry: 20  PT Therapeutic Procedures Time Entry  Therapeutic Exercise Time Entry: 20                   Insurance:  Visit:  1 of 10  Auth required: No  Payor: [employer] [place] / Plan: [employer] / Product Type: *No Product type* /     Assessment:  PT Assessment  PT Assessment Results: Decreased strength, Decreased mobility, Pain  Rehab Prognosis: Good  Evaluation/Treatment Tolerance: Patient tolerated treatment well   Patient presents to physical therapy with c/o R knee pain and R hip pain/weakness. Patient well known to this office.  Was treated last spring for R medial knee pain that started in January 2024 when hiking.  Was able to successfully complete therapy and manage sx independently.  Able to return to hiking/running/yoga all summer without issues.  States in Dec 2024 she started having medial knee pain again without JUWAN.  Was in to see ortho late January at which time an MRI was ordered.  In early February 2025 she states she attempted to go for a jog when she felt her R hip give out on her, provoking sx she had 8-10 yrs ago due to impingement.  Was back to see Dr. Goldberg in March and had injection for knee without relief.  MRI reveals mild OA and fissuring of patellar articular cartilage at the medial facet.  Continues to have sensation of weakness in R hip which has been more concerning for her.  Denies N/T.  Exhibits decreased ROM in R knee and with R hip ER, decreased hip strength, (+) EAGLE.  S/s consistent with referring diagnosis.     Plan:  OP PT Plan  Treatment/Interventions: Cryotherapy, Dry needling, Education/ Instruction, Electrical stimulation, Gait training, Hot pack, Manual therapy, " Neuromuscular re-education, Self care/ home management, Taping techniques, Therapeutic activities, Therapeutic exercises, Ultrasound  PT Plan: Skilled PT  PT Frequency: 1 time per week  Duration: 12wks  Onset Date: 12/01/24  Certification Period Start Date: 04/16/25  Rehab Potential: Good  Plan of Care Agreement: Patient    Current Problem:   1. Sprain of medial collateral ligament of knee, initial encounter  Referral to Physical Therapy    Follow Up In Physical Therapy      2. Right medial knee pain  Referral to Physical Therapy    Follow Up In Physical Therapy      3. Tendinopathy of gluteus medius  Referral to Physical Therapy    Follow Up In Physical Therapy          Subjective    Patient presents to physical therapy for evaluation of R knee and R hip.  Patient well known to this office.  Was treated last spring for R medial knee pain that started in January 2024 when hiking.  Was able to successfully complete therapy and manage sx independently.  Able to return to hiking/running/yoga all summer without issues.  States in Dec 2024 she started having medial knee pain again without JUWAN.  Was in to see ortho late January at which time an MRI was ordered.  In early February 2025 she states she attempted to go for a jog when she felt her R hip give out on her, provoking sx she had 8-10 yrs ago due to impingement.  Was back to see Dr. Goldberg in March and had injection for knee without relief.  MRI reveals mild OA and fissuring of patellar articular cartilage at the medial facet.  Continues to have sensation of weakness in R hip which has been more concerning for her.  Denies N/T.  C/o R knee pain and R hip pain/weakness.    PREVIOUS INTERVENTION:  PT  Injection - no relief    EXACERBATING FACTORS:  Jogging  Deep flexion (child's pose)  Ascending stairs (hip)  Squatting    RELIEVING FACTORS:  Stretching     OCCUPATION:  [employer]    HOBBIES:  Jogging  Hiking  Yoga     HOME SETUP:  2 story set up    BARRIERS  IMPACTING CARE:  Chronicity of issue    General:  General  Reason for Referral: R hip/knee pain  Referred By: Dr. Haskins  Precautions:  Precautions  STEADI Fall Risk Score (The score of 4 or more indicates an increased risk of falling): 0  Medical Precautions: No known precautions/limitation (Medical hx reviewed; not likely to impact care.)  Pain:  Pain Assessment  Pain Assessment: 0-10  0-10 (Numeric) Pain Score: 2  Pain Location: Hip  Pain Orientation: Right  Pain Radiating Towards: down HS and ITB  Pain Descriptors: Aching  Pain Frequency: Constant/continuous  Multiple Pain Sites: Two  Pain 2  Pain Score 2: 2  Pain Location 2: Knee  Pain Orientation 2: Right  Pain Descriptors 2: Sharp  Pain Frequency 2: Constant/continuous  Prior Level of Function:  Prior Function Per Pt/Caregiver Report  Level of [place]: Independent with ADLs and functional transfers    Objective   OBSERVATION  (+) dynamic valgus with SL squat R/L    AROM  R knee flexion 137  R knee extension 0    L knee flexion 150  L knee extension 0    R hip flexion 120  R hip ER 29  R hip IR 44    L hip flexion 119  L hip ER 45  L hip IR 35    STRENGTH  R hip flexion 5/5  R hip extension 4-/5  R hip ABD 5/5  R hip ER 3/5    L hip flexion 5/5  L hip extension 5/5  L hip ABD 5/5  L hip ER 5/5    PALPATION  TTP R glute med    SPECIAL TESTS  (+) EAGLE GOMEZ    Outcome Measures:  Other Measures  Lower Extremity Funtional Score (LEFS): 51/80     TREATMENT  THERAPEUTIC EXERCISE:  Access Code: [email or web address]  URL: [email or web address]  Date: 04/16/2025  Prepared by: Eduarda Garces    Exercises  - Supine Bridge  - 2 x daily - 7 x weekly - 1 sets - 10 reps  - Small Range Straight Leg Raise  - 2 x daily - 7 x weekly - 1 sets - 10 reps  - Sidelying Hip Abduction  - 2 x daily - 7 x weekly - 1 sets - 10 reps  - Standing Isometric Hip Abduction with Ball on Wall  - 2 x daily - 7 x weekly - 1 sets - 10 reps - 5 hold      EDUCATION:   Patient education on  diagnosis/prognosis, pathophysiology, POC, and HEP.  Patient demonstrates understanding of HEP/POC.    Goals:  1. Pain 0/10  2. Outcomes Measure:  LEFS 70/80  3. BLE strength 5/5 to allow patient to ambulate >30mins without increased.  4. Demonstrates independence with HEP.

## 2025-05-07 ENCOUNTER — TREATMENT (OUTPATIENT)
Dept: PHYSICAL THERAPY | Facility: CLINIC | Age: 45
End: 2025-05-07
Payer: COMMERCIAL

## 2025-05-07 DIAGNOSIS — S83.419A SPRAIN OF MEDIAL COLLATERAL LIGAMENT OF KNEE, INITIAL ENCOUNTER: ICD-10-CM

## 2025-05-07 DIAGNOSIS — M25.561 RIGHT MEDIAL KNEE PAIN: ICD-10-CM

## 2025-05-07 DIAGNOSIS — M67.959 TENDINOPATHY OF GLUTEUS MEDIUS: ICD-10-CM

## 2025-05-07 PROCEDURE — 97110 THERAPEUTIC EXERCISES: CPT | Mod: GP | Performed by: PHYSICAL THERAPIST

## 2025-05-07 PROCEDURE — 97140 MANUAL THERAPY 1/> REGIONS: CPT | Mod: GP | Performed by: PHYSICAL THERAPIST

## 2025-05-07 ASSESSMENT — PAIN SCALES - GENERAL
PAINLEVEL_OUTOF10: 2
PAINLEVEL_OUTOF10: 2

## 2025-05-07 ASSESSMENT — PAIN - FUNCTIONAL ASSESSMENT: PAIN_FUNCTIONAL_ASSESSMENT: 0-10

## 2025-05-07 NOTE — PROGRESS NOTES
"Physical Therapy Treatment    Patient Name: Kaylen Dalton  MRN: [id number]  Today's Date: 5/7/2025    Time Entry:   Time Calculation  Start Time: 1745  Stop Time: 1830  Time Calculation (min): 45 min     PT Therapeutic Procedures Time Entry  Manual Therapy Time Entry: 15  Therapeutic Exercise Time Entry: 25                   Insurance:  Visit:  2 of 10  Auth required: No  Payor: [employer] [place] / Plan: [facility] MED / Product Type: *No Product type* /     Assessment:   Improved piriformis activation with cueing.  Reports relief with MT.  Able to trial clamshells with pillow to minimize ROM.    Plan:   Continue with hip strengthening as tolerated.    Current Problem  1. Sprain of medial collateral ligament of knee, initial encounter  Follow Up In Physical Therapy      2. Right medial knee pain  Follow Up In Physical Therapy      3. Tendinopathy of gluteus medius  Follow Up In Physical Therapy          General  General  Reason for Referral: R hip/knee pain  Referred By: Dr. Haskins    Subjective    Has been feeling better but did have to jog to catch a bus quick and reports increased pain when doing so.    Precautions  Precautions  Medical Precautions: No known precautions/limitation (Medical hx reviewed; not likely to impact care.)  Pain  Pain Assessment  Pain Assessment: 0-10  0-10 (Numeric) Pain Score: 2  Pain Location: Hip  Pain Orientation: Right  Pain 2  Pain Score 2: 2  Pain Location 2: Knee  Pain Orientation 2: Right    Objective   AROM  R knee flexion 142  TTP R piriformis    TREATMENT  THERAPEUTIC EXERCISE:  Fig 4 + LTR stretch  Bridges 10x  SL ER to fatigue  Prone HS to glute kick 10x  Prone heel hold 5\"x10  Foam roller education    MANUAL THERAPY:  MFR to R piriformis with TT    NEUROMUSCULAR RE-EDUCATION:      THERAPEUTIC ACTIVITY:      MODALITIES:      OP EDUCATION:   Access Code: [email or web address]  URL: [email or web address]  Date: 05/07/2025  Prepared by: Eduarda" Turano    Exercises  - Supine Bridge  - 2 x daily - 7 x weekly - 1 sets - 10 reps  - Small Range Straight Leg Raise  - 2 x daily - 7 x weekly - 1 sets - 10 reps  - Sidelying Hip Abduction  - 2 x daily - 7 x weekly - 1 sets - 10 reps  - Standing Isometric Hip Abduction with Ball on Wall  - 2 x daily - 7 x weekly - 1 sets - 10 reps - 5 hold  - Supine Figure 4 Piriformis Stretch  - 2 x daily - 7 x weekly - 1 sets - 2 reps - 20 hold  - Prone Heel Squeeze  - 2 x daily - 7 x weekly - 1 sets - 10 reps - 5 hold  - Prone Hip Extension with Bent Knee  - 2 x daily - 7 x weekly - 1 sets - 10 reps  - Clamshell  - 2 x daily - 7 x weekly - 1 sets - 10 reps    Goals:  1. Pain 0/10  2. Outcomes Measure:  LEFS 70/80  3. BLE strength 5/5 to allow patient to ambulate >30mins without increased.  4. Demonstrates independence with HEP.

## 2025-05-21 ENCOUNTER — TREATMENT (OUTPATIENT)
Dept: PHYSICAL THERAPY | Facility: CLINIC | Age: 45
End: 2025-05-21
Payer: COMMERCIAL

## 2025-05-21 DIAGNOSIS — M67.959 TENDINOPATHY OF GLUTEUS MEDIUS: ICD-10-CM

## 2025-05-21 DIAGNOSIS — M25.561 RIGHT MEDIAL KNEE PAIN: ICD-10-CM

## 2025-05-21 DIAGNOSIS — S83.419A SPRAIN OF MEDIAL COLLATERAL LIGAMENT OF KNEE, INITIAL ENCOUNTER: ICD-10-CM

## 2025-05-21 PROCEDURE — 97110 THERAPEUTIC EXERCISES: CPT | Mod: GP | Performed by: PHYSICAL THERAPIST

## 2025-05-21 PROCEDURE — 97140 MANUAL THERAPY 1/> REGIONS: CPT | Mod: GP | Performed by: PHYSICAL THERAPIST

## 2025-05-21 ASSESSMENT — PAIN SCALES - GENERAL
PAINLEVEL_OUTOF10: 2
PAINLEVEL_OUTOF10: 2

## 2025-05-21 ASSESSMENT — PAIN - FUNCTIONAL ASSESSMENT: PAIN_FUNCTIONAL_ASSESSMENT: 0-10

## 2025-05-21 NOTE — PROGRESS NOTES
Physical Therapy Treatment    Patient Name: Kaylen Dalton  MRN: [id number]  Today's Date: 5/21/2025    Time Entry:   Time Calculation  Start Time: 1522  Stop Time: 1605  Time Calculation (min): 43 min     PT Therapeutic Procedures Time Entry  Manual Therapy Time Entry: 15  Therapeutic Exercise Time Entry: 25                   Insurance:  Visit:  3 of 10  Auth required: No  Payor: [employer] [place] / Plan: [employer] / Product Type: *No Product type* /     Assessment:   Able to perform clamshell without sx with cueing for form.  Initiated eccentric loading with HS without increased sx.  Reviewed Russian HS curl for HEP.    Plan:   Continue with hip strengthening as tolerated.    Current Problem  1. Sprain of medial collateral ligament of knee, initial encounter  Follow Up In Physical Therapy      2. Right medial knee pain  Follow Up In Physical Therapy      3. Tendinopathy of gluteus medius  Follow Up In Physical Therapy          General  General  Reason for Referral: R hip/knee pain  Referred By: Dr. Haskins    Subjective    Overall feeling a little stronger, but sx are about the same.  Plans to go back to see Dr. Goldberg about the knee bc that is not improving at all.  Able to do clamshell without pillow between knees but feels some pain.  Didn't get a foam roller until yesterday but not sure she is using it correctly.    Precautions  Precautions  Medical Precautions: No known precautions/limitation (Medical hx reviewed; not likely to impact care.)  Pain  Pain Assessment  Pain Assessment: 0-10  0-10 (Numeric) Pain Score: 2  Pain Location: Hip  Pain Orientation: Right  Pain 2  Pain Score 2: 2  Pain Location 2: Knee  Pain Orientation 2: Right    Objective   TTP R piriformis    TREATMENT  THERAPEUTIC EXERCISE:  Fig 4 + LTR stretch  Bridges + BTB hip ABD 10x  SL ER to fatigue  Foam roller review  Modified SL DL with 5# 10x ea    MANUAL THERAPY:  MFR to R piriformis with TT    NEUROMUSCULAR  RE-EDUCATION:      THERAPEUTIC ACTIVITY:      MODALITIES:      OP EDUCATION:   Access Code: [email or web address]  URL: [email or web address]  Date: 05/21/2025  Prepared by: Eduarda Garces    Exercises  - Supine Bridge  - 2 x daily - 7 x weekly - 1 sets - 10 reps  - Small Range Straight Leg Raise  - 2 x daily - 7 x weekly - 1 sets - 10 reps  - Sidelying Hip Abduction  - 2 x daily - 7 x weekly - 1 sets - 10 reps  - Standing Isometric Hip Abduction with Ball on Wall  - 2 x daily - 7 x weekly - 1 sets - 10 reps - 5 hold  - Supine Figure 4 Piriformis Stretch  - 2 x daily - 7 x weekly - 1 sets - 2 reps - 20 hold  - Prone Heel Squeeze  - 2 x daily - 7 x weekly - 1 sets - 10 reps - 5 hold  - Prone Hip Extension with Bent Knee  - 2 x daily - 7 x weekly - 1 sets - 10 reps  - Clamshell  - 2 x daily - 7 x weekly - 1 sets - 10 reps  - Single Leg Deadlift with Kettlebell  - 2 x daily - 7 x weekly - 1 sets - 10 reps    Goals:  1. Pain 0/10  2. Outcomes Measure:  LEFS 70/80  3. BLE strength 5/5 to allow patient to ambulate >30mins without increased.  4. Demonstrates independence with HEP.

## 2025-06-05 ENCOUNTER — HOSPITAL ENCOUNTER (OUTPATIENT)
Dept: RADIOLOGY | Facility: CLINIC | Age: 45
Discharge: HOME | End: 2025-06-05
Payer: COMMERCIAL

## 2025-06-05 VITALS — BODY MASS INDEX: 22.28 KG/M2 | HEIGHT: 68 IN | WEIGHT: 147 LBS

## 2025-06-05 DIAGNOSIS — Z12.31 BREAST CANCER SCREENING BY MAMMOGRAM: ICD-10-CM

## 2025-06-05 PROCEDURE — 77063 BREAST TOMOSYNTHESIS BI: CPT | Performed by: RADIOLOGY

## 2025-06-05 PROCEDURE — 77067 SCR MAMMO BI INCL CAD: CPT | Performed by: RADIOLOGY

## 2025-06-05 PROCEDURE — 77067 SCR MAMMO BI INCL CAD: CPT

## 2025-06-10 ENCOUNTER — TELEPHONE (OUTPATIENT)
Facility: CLINIC | Age: 45
End: 2025-06-10
Payer: COMMERCIAL

## 2025-06-10 NOTE — TELEPHONE ENCOUNTER
Patient is scheduled for July 11, 2025 and she said you usually put in lab work for her, if you could put in the orders.   Please call patient when orders are placed

## 2025-06-26 ENCOUNTER — TELEPHONE (OUTPATIENT)
Facility: CLINIC | Age: 45
End: 2025-06-26
Payer: COMMERCIAL

## 2025-06-26 NOTE — TELEPHONE ENCOUNTER
Pt has an appt next week. She would like to have her thyroid and lipids order checked prior to her appointment, so she could go to the lab first thing in the morning. Please advise patient.

## 2025-07-02 ENCOUNTER — APPOINTMENT (OUTPATIENT)
Facility: CLINIC | Age: 45
End: 2025-07-02
Payer: COMMERCIAL

## 2025-07-02 VITALS
DIASTOLIC BLOOD PRESSURE: 68 MMHG | WEIGHT: 146 LBS | TEMPERATURE: 97.5 F | HEIGHT: 68 IN | RESPIRATION RATE: 18 BRPM | SYSTOLIC BLOOD PRESSURE: 110 MMHG | OXYGEN SATURATION: 98 % | HEART RATE: 64 BPM | BODY MASS INDEX: 22.13 KG/M2

## 2025-07-02 DIAGNOSIS — R21 RASH: Primary | ICD-10-CM

## 2025-07-02 DIAGNOSIS — E06.3 HASHIMOTO'S THYROIDITIS: ICD-10-CM

## 2025-07-02 DIAGNOSIS — E78.5 HYPERLIPIDEMIA, UNSPECIFIED HYPERLIPIDEMIA TYPE: ICD-10-CM

## 2025-07-02 DIAGNOSIS — L30.9 DERMATITIS: ICD-10-CM

## 2025-07-02 PROCEDURE — 1036F TOBACCO NON-USER: CPT | Performed by: FAMILY MEDICINE

## 2025-07-02 PROCEDURE — 3008F BODY MASS INDEX DOCD: CPT | Performed by: FAMILY MEDICINE

## 2025-07-02 PROCEDURE — 99214 OFFICE O/P EST MOD 30 MIN: CPT | Performed by: FAMILY MEDICINE

## 2025-07-02 RX ORDER — TRIAMCINOLONE ACETONIDE 1 MG/G
CREAM TOPICAL 2 TIMES DAILY
Qty: 30 G | Refills: 0 | Status: SHIPPED | OUTPATIENT
Start: 2025-07-02 | End: 2025-07-16

## 2025-07-02 NOTE — PROGRESS NOTES
"Subjective   Patient ID: Kaylen Dalton \"Keiko" is a 44 y.o. female who presents for Rash, Hyperlipidemia, and Hypothyroidism.      She has a rash its more like little red pin point spots.  She was shaving her legs and noticed it. It's not painful or itchy.  She hasn't had any dry skin.  She just uses normal moisturizer or sunscreen.  She thought it was getting worse but it has been like this for the past week.  No bleeding from the gums.  No new medications or supplements. No excessive vaginal bleeding.    '  She is on her current dose of thyroid medication and had labs done in February.  She has been stable on this for over a year.  She has refills.     Rash    Hyperlipidemia        Review of Systems   Skin:  Positive for rash.       Current Outpatient Medications   Medication Instructions    atorvastatin (Lipitor) 10 mg tablet TAKE 1/2 TABLET (5 MG) BY MOUTH DAILY AT BEDTIME.    drospirenone-ethinyl estradiol (Tiffanie, 28,) 3-0.02 mg tablet 1 tablet, oral, Daily    fluticasone (Flonase) 50 mcg/actuation nasal spray 1-2 sprays, Daily    hydrocortisone (Anusol-HC) 2.5 % rectal cream rectal, 4 times daily PRN, Apply to affected areas    levothyroxine (SYNTHROID, LEVOXYL) 100 mcg, oral, Daily    loratadine (Claritin) 10 mg tablet 1 tablet, Daily    meloxicam (MOBIC) 15 mg, oral, Daily    multivitamin tablet 1 tablet, Daily    traZODone (DESYREL) 75 mg, oral, Nightly    triamcinolone (Kenalog) 0.1 % cream Topical, 2 times daily, Apply to affected area 1-2 times daily as needed. Avoid face and groin.       RX Allergies[1]  Erythromycin    Past Medical History:  03/13/2023: Anxiety  No date: Fibroadenoma  03/13/2023: Hashimoto's thyroiditis  03/13/2023: Hypothyroidism  03/13/2023: Hypovitaminosis D  03/13/2023: Insomnia  No date: Mastitis    Social History     Tobacco Use    Smoking status: Never    Smokeless tobacco: Never   Substance Use Topics    Alcohol use: Yes     Comment: socially       Objective     Vitals:    " "07/02/25 1224   BP: 110/68   BP Location: Left arm   Patient Position: Sitting   Pulse: 64   Resp: 18   Temp: 36.4 °C (97.5 °F)   TempSrc: Temporal   SpO2: 98%   Weight: 66.2 kg (146 lb)   Height: 1.727 m (5' 8\")     Patient's last menstrual period was 07/01/2025.    Physical Exam  Constitutional:       Appearance: Normal appearance.   HENT:      Head: Normocephalic and atraumatic.   Neck:      Thyroid: No thyroid mass, thyromegaly or thyroid tenderness.   Cardiovascular:      Rate and Rhythm: Normal rate and regular rhythm.   Pulmonary:      Effort: Pulmonary effort is normal.      Breath sounds: Normal breath sounds.   Musculoskeletal:      Cervical back: Normal range of motion and neck supple.   Skin:     General: Skin is warm and dry.      Findings: Rash (red tiny pin point spots scattered on the anterior lower legs) present.   Neurological:      Mental Status: She is alert.         Assessment/Plan   Diagnoses and all orders for this visit:  Rash  -     CBC and Auto Differential; Future  Hashimoto's thyroiditis  -     Thyroxine, Free; Future  -     Thyroid Stimulating Hormone; Future  Hyperlipidemia, unspecified hyperlipidemia type  -     Comprehensive Metabolic Panel; Future  -     Lipid Panel; Future  Dermatitis  -     Referral to Dermatology  -     triamcinolone (Kenalog) 0.1 % cream; Apply topically 2 times a day for 14 days. Apply to affected area 1-2 times daily as needed. Avoid face and groin.                 [1]   Allergies  Allergen Reactions    Erythromycin Nausea Only     "

## 2025-07-02 NOTE — PATIENT INSTRUCTIONS
Today we addressed your rash and I have advised trying a topical steroid to start and if this doesn't help we will have you see derm.  We will check a CBC as well to confirm that there is no platelet abnormality.    You are due for thyroid and lipids to be checked in August so Please have those done at that time.  Both labs are ordered    Please make an appointment to follow up for your Annual Physical.

## 2025-07-03 LAB
BASOPHILS # BLD AUTO: 41 CELLS/UL (ref 0–200)
BASOPHILS NFR BLD AUTO: 0.7 %
EOSINOPHIL # BLD AUTO: 41 CELLS/UL (ref 15–500)
EOSINOPHIL NFR BLD AUTO: 0.7 %
ERYTHROCYTE [DISTWIDTH] IN BLOOD BY AUTOMATED COUNT: 14.1 % (ref 11–15)
HCT VFR BLD AUTO: 43.8 % (ref 35–45)
HGB BLD-MCNC: 13.7 G/DL (ref 11.7–15.5)
LYMPHOCYTES # BLD AUTO: 1463 CELLS/UL (ref 850–3900)
LYMPHOCYTES NFR BLD AUTO: 24.8 %
MCH RBC QN AUTO: 30.5 PG (ref 27–33)
MCHC RBC AUTO-ENTMCNC: 31.3 G/DL (ref 32–36)
MCV RBC AUTO: 97.6 FL (ref 80–100)
MONOCYTES # BLD AUTO: 549 CELLS/UL (ref 200–950)
MONOCYTES NFR BLD AUTO: 9.3 %
NEUTROPHILS # BLD AUTO: 3806 CELLS/UL (ref 1500–7800)
NEUTROPHILS NFR BLD AUTO: 64.5 %
PLATELET # BLD AUTO: 215 THOUSAND/UL (ref 140–400)
PMV BLD REES-ECKER: 11 FL (ref 7.5–12.5)
RBC # BLD AUTO: 4.49 MILLION/UL (ref 3.8–5.1)
WBC # BLD AUTO: 5.9 THOUSAND/UL (ref 3.8–10.8)

## 2025-07-11 ENCOUNTER — APPOINTMENT (OUTPATIENT)
Facility: CLINIC | Age: 45
End: 2025-07-11
Payer: COMMERCIAL

## 2025-07-29 NOTE — PROGRESS NOTES
"A report with my findings and recommendations will be sent to the Kayy Self DO via written or electronic means when information is available    History of Present Illness:  Kaylen Dalton is a 44 y.o. female here for f/up of   1. Right medial knee pain    2. Chondromalacia of right patella    3. Piriformis syndrome of right side    12/2023 hiking and felt medial knee pain-- improved and able to walk but stayed sore and achy-   2/2024 saw Dr Goldberg Diagnosed as MCL sprain--   3/2024- 5/2024 --> PT with min improvement--   3/15/24 MRI done Minimal patellofemoral osteoarthrosis with focal fissuring of the medial patellar facet.  1/2025 recurrence of knee pain   3/6/25 MRI rt knee- mild OA med and pfj, edema in MCL  3/26/25 CSI rt knee -- no relief  3/2025-5/21/25 PT plus HEOP with min relief    7/30/2025 UPDATE: no re-injury. Active in walking, yoga, and swimming.  Not running due to right buttock pain for long time/ weakness more recent. Has managed with PT in past.  Pt states she con't to have a chronic soreness/tender to the touch of right knee.    When she made the appt 3 wks ago the pain was worse, today is a \"good\" with decreased discomfort.      She has chronic right buttock pain since 2010. Initial eval in 2010 or near there. Has been in and out of PT since then to manage her pain. Noticed she has more weakness now than in past.  Denies numb, ting   Has some pain down right posterior thigh at times. Min to no lumbar pain    Prior Treatment:   Physical Therapy  Yes - went 3 times plus HEP last visit 5/21/25-- feels overall improved/ stronger in hamstring but knee pain has not changed.   doing her HEP approx 4 x week.  Medications- h/o CSI 3/26/25 rt knee no improvement  NSAIDS- yes  Modified activities/sports she has limited and done PT-- no change in anterior medial knee pain    Past [facility] HX:  Specialty Problems    None  Medications: Medications Ordered Prior to Encounter[1]    Allergies:  " Allergies[2]     Physical Exam:  General appearance: Well-appearing well-nourished  Psych: Normal mood and affect    Standing Exam:  No obvious asymmetry or obliquity  Single leg stance: good balance, No Trendelenburg  Single leg semi-squat: valgus knee, opposite hip drop; no hip or knee pain  SL semi- squat: no hip joint or knee pain   SL hop test: no pain     Supine Exam:  Full PROM log roll    Full PROM KASHIF @ 90   EAGLE: negative for SI joint pain   EAGLE: distance off table: minimal. equal  FADIR: negative for anterior hip joint pain  Neg SLR- tightness in post thigh  5/5 Hip flexion R at 20d (iliopsoas)  5/5 Hip flexion L at 20d (iliopsoas)  Pain with any of above? NO    Palpation:   No TTP anterior hip joint line  + TTP:  superior greater trochanter  +TTP piriformis muscle  +TTP sciatic notch  No TTP: tensor fascia eun (TFL)  No TTP: Iliotibial band (ITB)  TTP SI joint none  TTP Midline lumbar spine none  TTP Lumbar paraspinal muscles none  + TTP:  rt Glutes  No TTP: ischial tuberosities  No TTP:  Hamstring  5/5 hip extension  Prone heel to butt: WNL KASHIF     KNEE EXAM:  INSPECTION:  no soft tissue swelling, redness, or warmth  no skin changes  no deformities    Knee: Full PROM without PAIN KASHIF  Knee ext: 5/5 KASHIF  Knee Flexion: 5/5 KASHIF    PALPATION:  Effusion: none  Peripatellar: ++ med>lat TTP, Neg Grind, normal glide, neg tilt, neg apprehension  Joint line: ant med + TTP  Tibial tubercle: No TTP  Hoffa's fat pad No TTP  Quad tendon: WNL, NTTP  Patella tendon: WNL, NTTP  MCL: No TTP, No pain with valgus stress, NO opening at 0d (deep), 30d (superficial)  LCL: No TTP, No Pain, NO varus opening at 30 (LCL), No varus opening at 0 & 30d (combined)  Lachmans: equal excursion kashif with endpoint --> NEG  McMurrays: no joint line pain or catch--> NEG  Bounce: NEG    Imaging: xr right hip performed today- no acute abnormalities seen. Radiology read pending    MR knee right wo IV contrast  03/06/2025    Narrative  Interpreted By:  Matias Colon,    FINDINGS:  There is a small joint effusion and Baker's cyst.    There is minimal thinning of the articular cartilage of the medial  compartment. There is no focal articular cartilage defect or loose  osteochondral lesion. There is some fissuring of the patellar  articular cartilage at the medial facet.    Small bone island is identified within the distal femoral metaphysis  laterally and femoral epiphysis medially as well as the anterior  patella. There is no bone marrow edema or additional osseous mass.    There is minimal edema and trace fluid superficial to the medial  collateral ligament and posteromedial joint capsule above and below  the joint line. There is no ligament discontinuity or laxity.    The anterior and posterior cruciate ligaments, lateral collateral  ligament complex, popliteus tendon, extensor complex, and patellar  retinacula are intact.    The lateral meniscus is of normal morphology. There is no linear tear  or truncation.    The medial meniscus is of normal morphology. There is mild edema at  the posterior meniscocapsular junction. There is no linear tear  truncation of the meniscus.    There is mild intramuscular edema within the medial soleus and  gastrocnemius.    Impression  Early osteoarthritic changes in the medial compartment. There is some  fissuring of the patellar articular cartilage at the medial facet.    Mild grade 1 sprain of the medial collateral ligament.    No evidence of meniscal tear.    Impression:  Kaylen Dalton is a 44 y.o. female here for f/up of   1. Right medial knee pain    2. Chondromalacia of right patella    3. Piriformis syndrome of right side         Plan:  Orders Placed This Encounter   Procedures    XR hip right with pelvis when performed 2 or 3 views    Referral to Physical Therapy    Referral to Physical Medicine Rehab     Discussed hip likely playing role in knee pain.   Recommend  increasing intensity of her core exercises and reviewed more  Discussed pelvic floor therapy may be helpful for her hip/buttock pain- referral placed  If not improving in next few months, call to schedule with PMR to get further evaluation and discusspossible treatment options which may include CSI    FOLLOW UP:  pending above   DIagnosis, evaluation, and treatment options were explained to patient in detail and questions answered.   See Patient Instructions for more details of what was provided to patient with further information on diagnosis, evaluation, and treatment.     Spent 60 minutes on patient with > 50% face to face time       [1]   Current Outpatient Medications on File Prior to Visit   Medication Sig Dispense Refill    atorvastatin (Lipitor) 10 mg tablet TAKE 1/2 TABLET (5 MG) BY MOUTH DAILY AT BEDTIME. 45 tablet 0    drospirenone-ethinyl estradiol (Tiffanie, 28,) 3-0.02 mg tablet Take 1 tablet by mouth once daily. 84 tablet 3    fluticasone (Flonase) 50 mcg/actuation nasal spray Administer 1-2 sprays into each nostril once daily.      hydrocortisone (Anusol-HC) 2.5 % rectal cream Insert into the rectum 4 times a day as needed for hemorrhoids (rectal discomfort). Apply to affected areas 30 g 0    levothyroxine (Synthroid, Levoxyl) 100 mcg tablet TAKE 1 TABLET BY MOUTH ONCE DAILY. 90 tablet 0    loratadine (Claritin) 10 mg tablet Take 1 tablet (10 mg) by mouth once daily.      meloxicam (Mobic) 15 mg tablet Take 1 tablet (15 mg) by mouth once daily. 30 tablet 2    multivitamin tablet Take 1 tablet by mouth once daily.      traZODone (Desyrel) 50 mg tablet Take 1.5 tablets (75 mg) by mouth once daily at bedtime. 135 tablet 1     No current facility-administered medications on file prior to visit.   [2]   Allergies  Allergen Reactions    Erythromycin Nausea Only

## 2025-07-30 ENCOUNTER — OFFICE VISIT (OUTPATIENT)
Dept: ORTHOPEDIC SURGERY | Facility: CLINIC | Age: 45
End: 2025-07-30
Payer: COMMERCIAL

## 2025-07-30 ENCOUNTER — HOSPITAL ENCOUNTER (OUTPATIENT)
Dept: RADIOLOGY | Facility: CLINIC | Age: 45
Discharge: HOME | End: 2025-07-30
Payer: COMMERCIAL

## 2025-07-30 DIAGNOSIS — M25.561 RIGHT MEDIAL KNEE PAIN: Primary | ICD-10-CM

## 2025-07-30 DIAGNOSIS — G57.01 PIRIFORMIS SYNDROME OF RIGHT SIDE: ICD-10-CM

## 2025-07-30 DIAGNOSIS — M22.41 CHONDROMALACIA OF RIGHT PATELLA: ICD-10-CM

## 2025-07-30 PROCEDURE — 99212 OFFICE O/P EST SF 10 MIN: CPT | Performed by: PEDIATRICS

## 2025-07-30 PROCEDURE — 73502 X-RAY EXAM HIP UNI 2-3 VIEWS: CPT | Mod: RIGHT SIDE | Performed by: RADIOLOGY

## 2025-07-30 PROCEDURE — 73502 X-RAY EXAM HIP UNI 2-3 VIEWS: CPT | Mod: RT

## 2025-07-30 NOTE — PATIENT INSTRUCTIONS
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 As exercises get easier, consider adding resistance or adding another set. Increase other activity to tolerance. Use brace for walking, biking, hiking, etc to support knee.    2) Knee and hip--  ice 15-20 min after activity and for pain  3) If pain present daily, take Naproxen Sodium/Alleve 2 tabs twice daily x 10-14 days then as needed or Meloxicam 1 tab daily prn  4) Start home exercises as discussed. Call now to schedule  pelvic floor PT. A script for PT is in chart -- we have a pelvic  at [facility].  5) referral to Physical Medicine and Rehab was placed as well. Call to schedule at your convenience. They will evaluate and may be able to further clarify the cause of your hip/buttock pain.   Dr. Cyr or Carlos  FOLLOW UP: pending response to above  Call Medicine Office @ [phone] to schedule, if not improving as expected over next 4-6 weeks, or sooner for any further concerns.    CHONDROMALACIA PATELLAE:  What is it?  Pain in the front of your knee due to irregular tracking or increased pressure of your kneecap (patella) on your thigh bone. The patella may pinch the soft tissues causing swelling and pain in the front of knee  Causes include malalignment, trauma, history of patella dislocation/subluxation, and functional malalignment due to:  Poor activation or weakness of core/buttock muscles  Imbalance of the anterior/posterior thigh muscles   Ankle pronation (roll in)     Signs and Symptoms:  Anterior knee pain that worsens during or after activity (running, stairs, jumping, etc)  Pain may be achy or sharp. Often worsens or stiffens with prolonged sitting.  Occasionally, you may feel a giving way of the knee, grinding or catching  sensation   Treatment:  Primary treatment is to correct alignment during activity with hip and core strengthening  Physical Therapy to improve strength/flexibility, teach appropriate mechanics, and create a home exercise program you can do on own 5+ days a week  Continue regular exercise as able (biking, swimming, or elliptical are good for cardio)  Weight lifting/core/plyometric exercise (align knee behind your toes and pointed toward little toe)  Pain Modification:  Activity modification to allow symptoms to calm down, otherwise activity to tolerance  Ice 10-15 minutes after activity. (Ice cups for massage 5-7min)   Anti-inflammatory medications (NSAIDs) may help if pain is constant   Naproxen (220mg) 1-2 tabs twice daily 10-14 days, then as needed for pain            Home Exercises to Start:  Lower Back Stretches  Stretching helps you maintain normal range of motion and loosens and activates your tight muscles to help resolve spasms. A slow, gradual stretching program built around these four exercises can help relieve your back pain:  1. Bridges  Lie on your back with your knees bent and your feet touching the floor. Raise your hips, keeping your back in a straight line with your knees and shoulders. Hold the bridge for six seconds. Repeat eight to 12 times.  2. Knee to Chest  Lie on your back with your knees bent and your feet touching the floor. Bring your right knee to your chest while keeping your left foot in place. Hold it for 15 to 30 seconds before lowering it back down. Repeat with the left leg. Perform two to four repetitions with each leg.  3. Press-up Back Extensions  Roll over to your stomach and place your elbows right underneath your shoulders and your hands flat on the ground. Push down on your hands and lift your shoulders away from the floor. Hold this position for several seconds. Repeat eight to 12 times. (Avoid this one if it increases your pain)  4. Bird Dogs  Get on your hands and knees  with both shoulder- and hip-width apart. Keep your back straight and your ab muscles tight. Lift your right leg, extend it straight behind you and hold for five seconds before lowering it down. Repeat on the other side. Do eight to 12 reps with each leg.  Core Strengthening Exercises  Strong lower back muscles are part of a good core. The surrounding core muscles in your hips, abs and butt must be just as strong to prevent injury and back pain. These exercises will strengthen your whole core:  1. Partial Crunches  Lie on your back with your knees bent and your feet flat on the floor. Place your hands behind your head or cross your arms around your chest. Tighten your abs and raise your shoulders off the floor as you breathe out. Hold the crunch for one second before lowering back down. Do eight to 12 partial crunches.  2. Pelvic Tilts  From the same position, draw in your stomach as if you're pulling your belly button towards your back. Hold for 10 seconds, breathing smoothly. Perform eight to 12 pelvic tilts.  3. Wall Sits  Stand up facing away from a wall with your back and heels a foot away. Lean your back flat against the wall and slide down until your knees are bent slightly. Gently press your low back into the wall and stay in the sitting position for 10 seconds before sliding back up the wall. Perform eight to 12 reps.  GLUTEAL AND HIP ACTIVATION PREHAB EXERCISES   Reprinted from Matias Reyes, CPT, VAN, CSCS  123 4    1. Tabletop Heel Lift  Start in a tabletop or quadruped position with the wrist aligned directly under the shoulder joints and the kneecaps directly beneath the hip sockets. Pull the belly button into the spine to engage the abdominals and hold the face parallel to the floor in order to create a neutral and stable spine. Next, drive one heel up into the lisa as high as possible while keeping the knee bent at 90 degrees and maintaining a neutral spine. Do not allow your lower back to arch.  Continue to pull the belly button into the spine s you press the heel into the lisa.  Perform 5-10 reps on each leg.    2. Hip Hike  Lie on your side with the bottom elbow, hip and anklebone all aligned in a straight line on the floor. Make sure the elbow is directly beneath the shoulder joint. Next, press the hips up into the lisa until the spine and legs are aligned in a straight line. Then lower down slowly and repeat several more times. This exercise will help activate the Gluteus Medius, which is located on the lateral side of the hip. If too difficult, just hold side plank for 10 seconds and repeat.   Perform 5-10 reps on each side.    3. Side Plank (+/- Hip Abduction) -add once can do side plank and hip hikes without difficulty.  Lie on your side with the bottom elbow, hip and anklebone all aligned in a straight line on the floor. Make sure the elbow is directly beneath the shoulder joint. Next, press the hips up into the lisa until the spine and legs are aligned in a straight line. Once you can do this well and hold for 30 seconds, then add the leg abduction.   Now, begin to lift and lower the top leg several times while maintaining a straight-line alignment with the bottom shoulder, hip and ankle. Do not let your hips drop towards the floor or hinge backwards. Maintain a neutral spine and move with control. This exercise will help activate the Gluteus Medius, which will serve to protect the ACL.  Perform 5-10 abductions with each leg.      4. Bridge March  Lie on the floor with the knees bent at 90 degrees and the forefoot (toes) off the floor. Press the hips up into the air until they align with the knees and shoulders in a straight line. Next, begin to march the legs by reaching one knee up into the lisa at a time. Make sure that the hips remain level with the floor. Do not allow one hip to drop towards the floor while marching; this is a sign of instability in the hip socket. Also, keep a neutral spine and  do not arch in the lower back.  Perform 10-15 marches with each leg.               [id number]      5. Single-Leg Bridge  Lie on the floor with the knees bent at 90 degrees and the forefoot (toes) off the floor. Press the hips up into the air until they align with the knees and shoulders in a straight line. Next, pull on leg into the torso and hug the kneecap tight into the heart. Then press into the floor with the opposite heel and drive the hips up into the air as high as possible. Lower down slowly and repeat several more times. Make sure that the lower back does not arch or over-extend and squeeze the leg into the chest as much as possible as this will lock the pelvis from 'rolling' when bridging. This exercise will help activate the Gluteus Rubén and Minimus as well as the Piriformis.  Perform 5-10 bridges with each leg.    6. Clams  Lie on your side with a small resistance band placed around your thighs or shine, as close to the knees as possible. For best results, position your body up against a wall with both the hips and heels touching the wall. Next, pull the knees apart from one another while keeping the heels touching and maintaining a neutral spine. Open the knees as far as possible on each and every rep and close them in a slow and controlled manner. This exercise will help to activate the Gluteus Medius and protect the ACL.  Perform 5-10 reps on each side.    7. Air Squat with Bands  Stand with the feet shoulder width apart and wrap a small resistance band around the legs as close as possible to the knees. Next, squat the hips down to the floor as low as possible and then return to standing. Repeat this movement several times and on each rep, actively press the knee out wide against the resistance band in order to help activate more muscles in the hips. Perform 10-15 reps      8. Band Walks: Forward & Lateral   an athletic position with the feet shoulder width apart and wrap a small resistance band  around the legs as close as possible to the knees. Next, walk forward while keeping the feet at shoulder width apart. Then, return to the same spot be walking backwards and keeping the feet at shoulder width apart. Next, walk to the side for several steps. Step out as far as possible on each step and then return to the same starting position. These exercises will help activate the Gluteus Medius and protect the ACL from injury. Walk 10-20 steps in each direction.

## 2025-08-16 DIAGNOSIS — E03.9 HYPOTHYROIDISM, UNSPECIFIED TYPE: ICD-10-CM

## 2025-08-16 DIAGNOSIS — E78.5 HYPERLIPIDEMIA, UNSPECIFIED HYPERLIPIDEMIA TYPE: ICD-10-CM

## 2025-08-19 RX ORDER — LEVOTHYROXINE SODIUM 100 UG/1
100 TABLET ORAL DAILY
Qty: 30 TABLET | Refills: 0 | Status: SHIPPED | OUTPATIENT
Start: 2025-08-19

## 2025-08-19 RX ORDER — ATORVASTATIN CALCIUM 10 MG/1
TABLET, FILM COATED ORAL
Qty: 15 TABLET | Refills: 0 | Status: SHIPPED | OUTPATIENT
Start: 2025-08-19

## 2025-08-29 LAB
ALBUMIN SERPL-MCNC: 4.1 G/DL (ref 3.6–5.1)
ALP SERPL-CCNC: 31 U/L (ref 31–125)
ALT SERPL-CCNC: 13 U/L (ref 6–29)
ANION GAP SERPL CALCULATED.4IONS-SCNC: 6 MMOL/L (CALC) (ref 7–17)
AST SERPL-CCNC: 23 U/L (ref 10–35)
BILIRUB SERPL-MCNC: 0.5 MG/DL (ref 0.2–1.2)
BUN SERPL-MCNC: 13 MG/DL (ref 7–25)
CALCIUM SERPL-MCNC: 8.9 MG/DL (ref 8.6–10.2)
CHLORIDE SERPL-SCNC: 104 MMOL/L (ref 98–110)
CHOLEST SERPL-MCNC: 209 MG/DL
CHOLEST/HDLC SERPL: 2.8 (CALC)
CO2 SERPL-SCNC: 27 MMOL/L (ref 20–32)
CREAT SERPL-MCNC: 1.08 MG/DL (ref 0.5–0.99)
EGFRCR SERPLBLD CKD-EPI 2021: 64 ML/MIN/1.73M2
GLUCOSE SERPL-MCNC: 81 MG/DL (ref 65–99)
HDLC SERPL-MCNC: 75 MG/DL
LDLC SERPL CALC-MCNC: 108 MG/DL (CALC)
NONHDLC SERPL-MCNC: 134 MG/DL (CALC)
POTASSIUM SERPL-SCNC: 4.1 MMOL/L (ref 3.5–5.3)
PROT SERPL-MCNC: 6.9 G/DL (ref 6.1–8.1)
SODIUM SERPL-SCNC: 137 MMOL/L (ref 135–146)
T4 FREE SERPL-MCNC: 1.3 NG/DL (ref 0.8–1.8)
TRIGL SERPL-MCNC: 146 MG/DL
TSH SERPL-ACNC: 3.19 MIU/L